# Patient Record
Sex: FEMALE | Race: WHITE | NOT HISPANIC OR LATINO | Employment: OTHER | URBAN - METROPOLITAN AREA
[De-identification: names, ages, dates, MRNs, and addresses within clinical notes are randomized per-mention and may not be internally consistent; named-entity substitution may affect disease eponyms.]

---

## 2019-10-31 ENCOUNTER — OFFICE VISIT (OUTPATIENT)
Dept: SURGERY | Facility: CLINIC | Age: 69
End: 2019-10-31

## 2019-10-31 VITALS — BODY MASS INDEX: 25.16 KG/M2 | HEART RATE: 78 BPM | WEIGHT: 142 LBS | HEIGHT: 63 IN | OXYGEN SATURATION: 99 %

## 2019-10-31 DIAGNOSIS — K44.9 LARGE HIATAL HERNIA: Primary | ICD-10-CM

## 2019-10-31 PROCEDURE — 99204 OFFICE O/P NEW MOD 45 MIN: CPT | Performed by: SURGERY

## 2019-10-31 RX ORDER — CETIRIZINE HYDROCHLORIDE 10 MG/1
10 TABLET ORAL DAILY
COMMUNITY
End: 2023-04-05

## 2019-10-31 RX ORDER — FEXOFENADINE HCL 180 MG/1
180 TABLET ORAL DAILY
COMMUNITY
End: 2019-11-18 | Stop reason: HOSPADM

## 2019-11-02 NOTE — PROGRESS NOTES
"SUMMARY (A/P):    69-year-old lady with history of gastroesophageal reflux symptoms controlled with Prilosec and now Protonix.  Ongoing symptoms of chest pain/discomfort that improves with belching.  Chest x-ray demonstrating moderate hiatal hernia.  I think her symptoms may indeed be related to the hiatal hernia and have recommended further evaluation.  She is scheduled for upper GI and further recommendations will follow acquisition of that study.      CC:    Hiatal hernia    HPI:    69-year-old lady reports presenting to her physician several weeks ago for mild pulmonary issues which led to chest x-ray.  Chest x-ray demonstrated moderate size hiatal hernia and she was referred to Dr. Estrada's office and from there she was referred here without further work-up.  She indicates that she has many year history of associated gastroesophageal reflux symptoms for which she has been on Protonix for 1 month and much longer had been on Prilosec prior to that.  She said she gets good control of her reflux symptoms but still has intermittent significant indigestion (specifically, she has mild to moderate chest pain/discomfort with meals that resolves with belching\").    PSH:    Colonoscopy 2018  Partial pituitary excision  Partial hysterectomy 1978    PMH:    Arthritis  Depression  Gastroesophageal reflux disease  Hyperlipidemia  Hypothyroidism    FAMILY HISTORY:    Negative for colorectal cancer    SOCIAL HISTORY:   Denies tobacco use  Occasional alcohol use    ALLERGIES: reviewed, in Epic    MEDICATIONS: reviewed, in Epic    ROS:  No chest pain or shortness of air.  All other systems reviewed and negative other than presenting complaints.    RADIOLOGY/ENDOSCOPY:    Chest x-ray 10/15/2019 [facility] [place]: Normal except moderate hiatal hernia.  I reviewed the disc of those images and concur.    PHYSICAL EXAM:   Constitutional: Well-developed well-nourished, no acute distress  Vital signs: HR 78, " weight 142 pounds, height 63 inches, BMI 25.2  Eyes: Conjunctiva normal, sclera nonicteric  ENMT: Hearing grossly normal, oral mucosa moist  Neck: Supple, no palpable mass, trachea midline  Respiratory: Clear to auscultation, normal inspiratory effort  Cardiovascular: Regular rate, no murmur, no carotid bruit, no peripheral edema, no jugular venous distention  Gastrointestinal: Soft, nontender, no palpable mass, no hepatosplenomegaly, negative for hernia, bowel sounds normal  Lymphatics (palpable nodes):  cervical-negative, axillary-negative  Skin:  Warm, dry, no rash on visualized skin surfaces  Musculoskeletal: Symmetric strength, normal gait  Psychiatric: Alert and oriented ×3, normal affect     KAREN DOZIER M.D.

## 2019-11-12 DIAGNOSIS — K44.9 LARGE HIATAL HERNIA: ICD-10-CM

## 2019-11-16 ENCOUNTER — APPOINTMENT (OUTPATIENT)
Dept: CT IMAGING | Facility: HOSPITAL | Age: 69
End: 2019-11-16

## 2019-11-16 ENCOUNTER — APPOINTMENT (OUTPATIENT)
Dept: GENERAL RADIOLOGY | Facility: HOSPITAL | Age: 69
End: 2019-11-16

## 2019-11-16 ENCOUNTER — HOSPITAL ENCOUNTER (OUTPATIENT)
Facility: HOSPITAL | Age: 69
Setting detail: OBSERVATION
Discharge: HOME | End: 2019-11-18
Attending: EMERGENCY MEDICINE | Admitting: HOSPITALIST

## 2019-11-16 DIAGNOSIS — R55 SYNCOPE AND COLLAPSE: Primary | ICD-10-CM

## 2019-11-16 DIAGNOSIS — E87.1 HYPONATREMIA: ICD-10-CM

## 2019-11-16 DIAGNOSIS — R11.2 NON-INTRACTABLE VOMITING WITH NAUSEA, UNSPECIFIED VOMITING TYPE: ICD-10-CM

## 2019-11-16 PROBLEM — E78.5 HYPERLIPIDEMIA: Status: ACTIVE | Noted: 2019-11-16

## 2019-11-16 PROBLEM — E03.9 HYPOTHYROIDISM: Status: ACTIVE | Noted: 2019-11-16

## 2019-11-16 LAB
ABO GROUP BLD: NORMAL
ALBUMIN SERPL-MCNC: 4.4 G/DL (ref 3.5–5.2)
ALBUMIN/GLOB SERPL: 1.6 G/DL
ALP SERPL-CCNC: 93 U/L (ref 39–117)
ALT SERPL W P-5'-P-CCNC: 27 U/L (ref 1–33)
ANION GAP SERPL CALCULATED.3IONS-SCNC: 13.8 MMOL/L (ref 5–15)
AST SERPL-CCNC: 30 U/L (ref 1–32)
BACTERIA UR QL AUTO: ABNORMAL /HPF
BASOPHILS # BLD AUTO: 0.07 10*3/MM3 (ref 0–0.2)
BASOPHILS NFR BLD AUTO: 0.8 % (ref 0–1.5)
BILIRUB SERPL-MCNC: 0.8 MG/DL (ref 0.2–1.2)
BILIRUB UR QL STRIP: NEGATIVE
BLD GP AB SCN SERPL QL: NEGATIVE
BUN BLD-MCNC: 11 MG/DL (ref 8–23)
BUN/CREAT SERPL: 10.2 (ref 7–25)
CALCIUM SPEC-SCNC: 9.7 MG/DL (ref 8.6–10.5)
CHLORIDE SERPL-SCNC: 97 MMOL/L (ref 98–107)
CLARITY UR: ABNORMAL
CO2 SERPL-SCNC: 22.2 MMOL/L (ref 22–29)
COLOR UR: YELLOW
CREAT BLD-MCNC: 1.08 MG/DL (ref 0.57–1)
DEPRECATED RDW RBC AUTO: 41.7 FL (ref 37–54)
EOSINOPHIL # BLD AUTO: 0.25 10*3/MM3 (ref 0–0.4)
EOSINOPHIL NFR BLD AUTO: 2.9 % (ref 0.3–6.2)
ERYTHROCYTE [DISTWIDTH] IN BLOOD BY AUTOMATED COUNT: 12.6 % (ref 12.3–15.4)
EXPIRATION DATE: NORMAL
FECAL OCCULT BLOOD SCREEN, POC: NEGATIVE
GFR SERPL CREATININE-BSD FRML MDRD: 50 ML/MIN/1.73
GLOBULIN UR ELPH-MCNC: 2.8 GM/DL
GLUCOSE BLD-MCNC: 89 MG/DL (ref 65–99)
GLUCOSE BLDC GLUCOMTR-MCNC: 91 MG/DL (ref 70–130)
GLUCOSE UR STRIP-MCNC: NEGATIVE MG/DL
HCT VFR BLD AUTO: 44.3 % (ref 34–46.6)
HGB BLD-MCNC: 14.6 G/DL (ref 12–15.9)
HGB UR QL STRIP.AUTO: NEGATIVE
HOLD SPECIMEN: NORMAL
HOLD SPECIMEN: NORMAL
HYALINE CASTS UR QL AUTO: ABNORMAL /LPF
IMM GRANULOCYTES # BLD AUTO: 0.02 10*3/MM3 (ref 0–0.05)
IMM GRANULOCYTES NFR BLD AUTO: 0.2 % (ref 0–0.5)
KETONES UR QL STRIP: ABNORMAL
LEUKOCYTE ESTERASE UR QL STRIP.AUTO: ABNORMAL
LYMPHOCYTES # BLD AUTO: 4.12 10*3/MM3 (ref 0.7–3.1)
LYMPHOCYTES NFR BLD AUTO: 48.1 % (ref 19.6–45.3)
Lab: 128
MAGNESIUM SERPL-MCNC: 2.2 MG/DL (ref 1.6–2.4)
MCH RBC QN AUTO: 29.8 PG (ref 26.6–33)
MCHC RBC AUTO-ENTMCNC: 33 G/DL (ref 31.5–35.7)
MCV RBC AUTO: 90.4 FL (ref 79–97)
MONOCYTES # BLD AUTO: 1.04 10*3/MM3 (ref 0.1–0.9)
MONOCYTES NFR BLD AUTO: 12.1 % (ref 5–12)
NEGATIVE CONTROL: NEGATIVE
NEUTROPHILS # BLD AUTO: 3.07 10*3/MM3 (ref 1.7–7)
NEUTROPHILS NFR BLD AUTO: 35.9 % (ref 42.7–76)
NITRITE UR QL STRIP: NEGATIVE
NRBC BLD AUTO-RTO: 0 /100 WBC (ref 0–0.2)
PH UR STRIP.AUTO: <=5 [PH] (ref 5–8)
PLATELET # BLD AUTO: 261 10*3/MM3 (ref 140–450)
PMV BLD AUTO: 9.7 FL (ref 6–12)
POSITIVE CONTROL: POSITIVE
POTASSIUM BLD-SCNC: 3.6 MMOL/L (ref 3.5–5.2)
PROT SERPL-MCNC: 7.2 G/DL (ref 6–8.5)
PROT UR QL STRIP: ABNORMAL
RBC # BLD AUTO: 4.9 10*6/MM3 (ref 3.77–5.28)
RBC # UR: ABNORMAL /HPF
REF LAB TEST METHOD: ABNORMAL
RH BLD: POSITIVE
SODIUM BLD-SCNC: 133 MMOL/L (ref 136–145)
SP GR UR STRIP: 1.02 (ref 1–1.03)
SQUAMOUS #/AREA URNS HPF: ABNORMAL /HPF
T&S EXPIRATION DATE: NORMAL
TROPONIN T SERPL-MCNC: <0.01 NG/ML (ref 0–0.03)
UROBILINOGEN UR QL STRIP: ABNORMAL
WBC NRBC COR # BLD: 8.57 10*3/MM3 (ref 3.4–10.8)
WBC UR QL AUTO: ABNORMAL /HPF
WHOLE BLOOD HOLD SPECIMEN: NORMAL
WHOLE BLOOD HOLD SPECIMEN: NORMAL

## 2019-11-16 PROCEDURE — 81001 URINALYSIS AUTO W/SCOPE: CPT | Performed by: EMERGENCY MEDICINE

## 2019-11-16 PROCEDURE — 82962 GLUCOSE BLOOD TEST: CPT

## 2019-11-16 PROCEDURE — 85025 COMPLETE CBC W/AUTO DIFF WBC: CPT | Performed by: EMERGENCY MEDICINE

## 2019-11-16 PROCEDURE — 96372 THER/PROPH/DIAG INJ SC/IM: CPT

## 2019-11-16 PROCEDURE — 82270 OCCULT BLOOD FECES: CPT | Performed by: EMERGENCY MEDICINE

## 2019-11-16 PROCEDURE — 86901 BLOOD TYPING SEROLOGIC RH(D): CPT | Performed by: EMERGENCY MEDICINE

## 2019-11-16 PROCEDURE — 93010 ELECTROCARDIOGRAM REPORT: CPT | Performed by: INTERNAL MEDICINE

## 2019-11-16 PROCEDURE — 25010000002 ENOXAPARIN PER 10 MG: Performed by: HOSPITALIST

## 2019-11-16 PROCEDURE — 80053 COMPREHEN METABOLIC PANEL: CPT | Performed by: EMERGENCY MEDICINE

## 2019-11-16 PROCEDURE — 70450 CT HEAD/BRAIN W/O DYE: CPT

## 2019-11-16 PROCEDURE — 84484 ASSAY OF TROPONIN QUANT: CPT | Performed by: EMERGENCY MEDICINE

## 2019-11-16 PROCEDURE — 86900 BLOOD TYPING SEROLOGIC ABO: CPT | Performed by: EMERGENCY MEDICINE

## 2019-11-16 PROCEDURE — 96374 THER/PROPH/DIAG INJ IV PUSH: CPT

## 2019-11-16 PROCEDURE — G0378 HOSPITAL OBSERVATION PER HR: HCPCS

## 2019-11-16 PROCEDURE — 83735 ASSAY OF MAGNESIUM: CPT | Performed by: EMERGENCY MEDICINE

## 2019-11-16 PROCEDURE — 71045 X-RAY EXAM CHEST 1 VIEW: CPT

## 2019-11-16 PROCEDURE — 86850 RBC ANTIBODY SCREEN: CPT | Performed by: EMERGENCY MEDICINE

## 2019-11-16 PROCEDURE — 25010000002 METOCLOPRAMIDE PER 10 MG: Performed by: EMERGENCY MEDICINE

## 2019-11-16 PROCEDURE — 93005 ELECTROCARDIOGRAM TRACING: CPT | Performed by: EMERGENCY MEDICINE

## 2019-11-16 PROCEDURE — P9612 CATHETERIZE FOR URINE SPEC: HCPCS

## 2019-11-16 PROCEDURE — 99285 EMERGENCY DEPT VISIT HI MDM: CPT

## 2019-11-16 RX ORDER — SODIUM CHLORIDE 0.9 % (FLUSH) 0.9 %
10 SYRINGE (ML) INJECTION AS NEEDED
Status: DISCONTINUED | OUTPATIENT
Start: 2019-11-16 | End: 2019-11-16

## 2019-11-16 RX ORDER — LEVOTHYROXINE SODIUM 0.07 MG/1
75 TABLET ORAL
Status: DISCONTINUED | OUTPATIENT
Start: 2019-11-17 | End: 2019-11-18 | Stop reason: HOSPADM

## 2019-11-16 RX ORDER — ONDANSETRON 4 MG/1
4 TABLET, FILM COATED ORAL EVERY 6 HOURS PRN
Status: DISCONTINUED | OUTPATIENT
Start: 2019-11-16 | End: 2019-11-18 | Stop reason: HOSPADM

## 2019-11-16 RX ORDER — L.ACID,PARA/B.BIFIDUM/S.THERM 8B CELL
1 CAPSULE ORAL DAILY
Status: DISCONTINUED | OUTPATIENT
Start: 2019-11-17 | End: 2019-11-18 | Stop reason: HOSPADM

## 2019-11-16 RX ORDER — PANTOPRAZOLE SODIUM 40 MG/1
40 TABLET, DELAYED RELEASE ORAL
Status: DISCONTINUED | OUTPATIENT
Start: 2019-11-17 | End: 2019-11-18 | Stop reason: HOSPADM

## 2019-11-16 RX ORDER — ACETAMINOPHEN 325 MG/1
650 TABLET ORAL EVERY 4 HOURS PRN
Status: DISCONTINUED | OUTPATIENT
Start: 2019-11-16 | End: 2019-11-18 | Stop reason: HOSPADM

## 2019-11-16 RX ORDER — PREDNISONE 2.5 MG
2.5 TABLET ORAL DAILY
Status: DISCONTINUED | OUTPATIENT
Start: 2019-11-17 | End: 2019-11-18

## 2019-11-16 RX ORDER — GLIMEPIRIDE 2 MG/1
1 TABLET ORAL DAILY
COMMUNITY

## 2019-11-16 RX ORDER — PRAMIPEXOLE DIHYDROCHLORIDE 0.25 MG/1
0.12 TABLET ORAL DAILY
Status: DISCONTINUED | OUTPATIENT
Start: 2019-11-16 | End: 2019-11-17

## 2019-11-16 RX ORDER — PSYLLIUM SEED (WITH DEXTROSE)
2 POWDER (GRAM) ORAL DAILY
Status: DISCONTINUED | OUTPATIENT
Start: 2019-11-16 | End: 2019-11-18 | Stop reason: HOSPADM

## 2019-11-16 RX ORDER — ESCITALOPRAM OXALATE 20 MG/1
20 TABLET ORAL DAILY
Status: DISCONTINUED | OUTPATIENT
Start: 2019-11-17 | End: 2019-11-18 | Stop reason: HOSPADM

## 2019-11-16 RX ORDER — ACETAMINOPHEN 650 MG/1
650 SUPPOSITORY RECTAL EVERY 4 HOURS PRN
Status: DISCONTINUED | OUTPATIENT
Start: 2019-11-16 | End: 2019-11-18 | Stop reason: HOSPADM

## 2019-11-16 RX ORDER — SODIUM CHLORIDE 9 MG/ML
75 INJECTION, SOLUTION INTRAVENOUS CONTINUOUS
Status: DISCONTINUED | OUTPATIENT
Start: 2019-11-16 | End: 2019-11-18 | Stop reason: HOSPADM

## 2019-11-16 RX ORDER — ONDANSETRON 2 MG/ML
4 INJECTION INTRAMUSCULAR; INTRAVENOUS EVERY 6 HOURS PRN
Status: DISCONTINUED | OUTPATIENT
Start: 2019-11-16 | End: 2019-11-18 | Stop reason: HOSPADM

## 2019-11-16 RX ORDER — CETIRIZINE HYDROCHLORIDE 10 MG/1
5 TABLET ORAL DAILY
Status: DISCONTINUED | OUTPATIENT
Start: 2019-11-16 | End: 2019-11-18 | Stop reason: HOSPADM

## 2019-11-16 RX ORDER — ACETAMINOPHEN 160 MG/5ML
650 SOLUTION ORAL EVERY 4 HOURS PRN
Status: DISCONTINUED | OUTPATIENT
Start: 2019-11-16 | End: 2019-11-18 | Stop reason: HOSPADM

## 2019-11-16 RX ORDER — METOCLOPRAMIDE HYDROCHLORIDE 5 MG/ML
10 INJECTION INTRAMUSCULAR; INTRAVENOUS ONCE
Status: COMPLETED | OUTPATIENT
Start: 2019-11-16 | End: 2019-11-16

## 2019-11-16 RX ORDER — NITROGLYCERIN 0.4 MG/1
0.4 TABLET SUBLINGUAL
Status: DISCONTINUED | OUTPATIENT
Start: 2019-11-16 | End: 2019-11-18 | Stop reason: HOSPADM

## 2019-11-16 RX ADMIN — ENOXAPARIN SODIUM 40 MG: 40 INJECTION SUBCUTANEOUS at 19:44

## 2019-11-16 RX ADMIN — METOCLOPRAMIDE 10 MG: 5 INJECTION, SOLUTION INTRAMUSCULAR; INTRAVENOUS at 13:20

## 2019-11-16 RX ADMIN — SODIUM CHLORIDE 125 ML/HR: 9 INJECTION, SOLUTION INTRAVENOUS at 19:43

## 2019-11-16 RX ADMIN — PRAMIPEXOLE DIHYDROCHLORIDE 0.12 MG: 0.25 TABLET ORAL at 20:29

## 2019-11-16 RX ADMIN — SODIUM CHLORIDE 1000 ML: 9 INJECTION, SOLUTION INTRAVENOUS at 13:17

## 2019-11-16 RX ADMIN — SODIUM CHLORIDE, PRESERVATIVE FREE 10 ML: 5 INJECTION INTRAVENOUS at 13:18

## 2019-11-16 RX ADMIN — Medication 2 WAFER: at 20:29

## 2019-11-16 RX ADMIN — CETIRIZINE HYDROCHLORIDE 5 MG: 10 TABLET, FILM COATED ORAL at 20:28

## 2019-11-16 NOTE — H&P
"    Patient Name:  Mariana Gilbert  YOB: 1950  MRN:  [id number]  Admit Date:  11/16/2019  Patient Care Team:  Racheal Adams APRN as PCP - General (Family Medicine)  Edward Sterling MD as PCP - Family Medicine      Subjective   History Present Illness     Chief Complaint   Patient presents with   • Syncope       Ms. Gilbert is a 69 y.o. former smoker with a history of GERD, HLD and hypothyroidism that presents to [facility] complaining of a syncopal episode.  She states she has not felt very well since getting a flu shot about 3 days ago.  She had a small loose stool this morning but no abdominal pain.  Felt okay enough to go shopping.  She was walking with family members when she started feeling abnormal.  She became a little lightheaded and nauseated and decided to sit down.  She then passed out.  This lasted for about 20 seconds but there was an episode of fecal incontinence.  When she woke up she was very nauseated and had emesis.  It was reported both legs were \"twitching.\"  But no generalized seizure activity.  Patient reports a similar episode a few months ago but there is no incontinence at that time.  She did not seek medical attention at that time.  She denies any recent change to her medications.  There is no associated chest pain or palpitations.  She has previously seen Dr. Velázquez.  She had a negative stress test in January 2016.  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  She takes prednisone and Synthroid and has not missed any dosages.        History of Present Illness  Review of Systems   HENT: Negative.    Respiratory: Negative.    Cardiovascular: Negative for chest pain.   Gastrointestinal: Positive for diarrhea, nausea and vomiting.   Endocrine: Negative.    Genitourinary: Negative.    Musculoskeletal: Negative.    Skin: Negative.    Neurological: Positive for weakness and " light-headedness.   Hematological: Negative.    Psychiatric/Behavioral: Negative.         Personal History     Past Medical History:   Diagnosis Date   • Arthritis    • Depression    • Exertional dyspnea    • Fatigue    • GERD (gastroesophageal reflux disease)    • Hyperlipidemia    • Hypothyroidism    • IBS (irritable bowel syndrome)    • Seasonal allergies      Past Surgical History:   Procedure Laterality Date   • BREAST BIOPSY Bilateral 1970s    benign pathology   • COLONOSCOPY N/A 2018    done at [facility]   • PARTIAL HYSTERECTOMY Bilateral     Bilateral ovaries in tact-Dr. Ulrich   • PITUITARY EXCISION      partial     Family History   Problem Relation Age of Onset   • Heart disease Mother    • Aortic aneurysm Sister    • Heart disease Sister    • Breast cancer Sister    • Aortic aneurysm Brother    • Heart disease Brother    • Stroke Brother         CVA   • Hypertension Other      Social History     Tobacco Use   • Smoking status: Former Smoker     Packs/day: 1.00     Years: 30.00     Pack years: 30.00     Types: Cigarettes     Last attempt to quit: 2000     Years since quittin.8   • Smokeless tobacco: Never Used   Substance Use Topics   • Alcohol use: Yes     Comment: social, 1-2 drinks occassionally   • Drug use: No     No current facility-administered medications on file prior to encounter.      Current Outpatient Medications on File Prior to Encounter   Medication Sig Dispense Refill   • cetirizine (zyrTEC) 10 MG tablet Take 10 mg by mouth Daily.     • Difluprednate 0.05 % emulsion Administer 1 drop to the right eye Daily.     • escitalopram (LEXAPRO) 20 MG tablet Take 20 mg by mouth Daily.     • fexofenadine (ALLEGRA) 180 MG tablet Take 180 mg by mouth Daily.     • levothyroxine (SYNTHROID, LEVOTHROID) 75 MCG tablet Take 75 mcg by mouth Daily.     • pantoprazole (PROTONIX) 40 MG EC tablet Take 40 mg by mouth Daily.     • pramipexole (MIRAPEX) 0.125 MG tablet Take 0.125 mg by mouth  Daily.     • predniSONE (DELTASONE) 2.5 MG tablet Take 2.5 mg by mouth Daily.     • Probiotic Product (PROBIOTIC PO) Take 1 capsule by mouth Daily. Culturelle OTC     • psyllium (METAMUCIL) 58.6 % packet Take 1 packet by mouth Daily.     • timolol (TIMOPTIC) 0.25 % ophthalmic solution Administer 1 drop into the left eye Daily.     • [DISCONTINUED] montelukast (SINGULAIR) 10 MG tablet Take 10 mg by mouth As Needed.       Allergies   Allergen Reactions   • Sulfa Antibiotics Rash       Objective    Objective     Vital Signs  Temp:  [97 °F (36.1 °C)-99 °F (37.2 °C)] 98.5 °F (36.9 °C)  Heart Rate:  [] 106  Resp:  [16-18] 16  BP: (105-138)/(60-83) 131/80  SpO2:  [95 %-98 %] 96 %  on   ;   Device (Oxygen Therapy): room air  Body mass index is 25.77 kg/m².    Physical Exam   Constitutional: She is oriented to person, place, and time. She appears well-developed and well-nourished. No distress.   HENT:   Head: Normocephalic and atraumatic.   Eyes: Conjunctivae and EOM are normal. No scleral icterus.   Neck: Normal range of motion. No JVD present.   Cardiovascular: Normal rate and regular rhythm.  Extrasystoles are present.   Pulmonary/Chest: Effort normal and breath sounds normal.   Abdominal: Soft. Bowel sounds are normal. She exhibits no distension. There is no tenderness.   Musculoskeletal: Normal range of motion. She exhibits no edema.   Neurological: She is alert and oriented to person, place, and time.   Skin: Skin is warm and dry.   Psychiatric: She has a normal mood and affect. Her behavior is normal.   Nursing note and vitals reviewed.      Results Review:  I reviewed the patient's new clinical results.  I reviewed the patient's new imaging results and agree with the interpretation.  I reviewed the patient's other test results and agree with the interpretation  I personally viewed and interpreted the patient's EKG/Telemetry data  Discussed with ED provider.    Lab Results (last 24 hours)     Procedure Component  Value Units Date/Time    POC Glucose Once [[id number]]  (Normal) Collected:  11/16/19 1227    Specimen:  Blood Updated:  11/16/19 1229     Glucose 91 mg/dL     POC Occult Blood Stool [[id number]]  (Normal) Collected:  11/16/19 1232    Specimen:  Stool from Per Rectum Updated:  11/16/19 1233     Fecal Occult Blood Negative     Lot Number [id number]     Expiration Date 05/31/2020     Positive Control Positive     Negative Control Negative    Troponin [[id number]]  (Normal) Collected:  11/16/19 1316    Specimen:  Blood Updated:  11/16/19 1356     Troponin T <0.010 ng/mL     Narrative:       Troponin T Reference Range:  <= 0.03 ng/mL-   Negative for AMI  >0.03 ng/mL-     Abnormal for myocardial necrosis.  Clinicians would have to utilize clinical acumen, EKG, Troponin and serial changes to determine if it is an Acute Myocardial Infarction or myocardial injury due to an underlying chronic condition.     CBC & Differential [[id number]] Collected:  11/16/19 1316    Specimen:  Blood Updated:  11/16/19 1337    Narrative:       The following orders were created for panel order CBC & Differential.  Procedure                               Abnormality         Status                     ---------                               -----------         ------                     CBC Auto Differential[[id number]]        Abnormal            Final result                 Please view results for these tests on the individual orders.    Comprehensive Metabolic Panel [[id number]]  (Abnormal) Collected:  11/16/19 1316    Specimen:  Blood Updated:  11/16/19 1357     Glucose 89 mg/dL      BUN 11 mg/dL      Creatinine 1.08 mg/dL      Sodium 133 mmol/L      Potassium 3.6 mmol/L      Chloride 97 mmol/L      CO2 22.2 mmol/L      Calcium 9.7 mg/dL      Total Protein 7.2 g/dL      Albumin 4.40 g/dL      ALT (SGPT) 27 U/L      AST (SGOT) 30 U/L      Alkaline Phosphatase 93 U/L      Total Bilirubin 0.8 mg/dL      eGFR Non African Amer 50 mL/min/1.73      Globulin 2.8  gm/dL      A/G Ratio 1.6 g/dL      BUN/Creatinine Ratio 10.2     Anion Gap 13.8 mmol/L     Narrative:       GFR Normal >60  Chronic Kidney Disease <60  Kidney Failure <15    Magnesium [[id number]]  (Normal) Collected:  11/16/19 1316    Specimen:  Blood Updated:  11/16/19 1356     Magnesium 2.2 mg/dL     CBC Auto Differential [[id number]]  (Abnormal) Collected:  11/16/19 1316    Specimen:  Blood Updated:  11/16/19 1337     WBC 8.57 10*3/mm3      RBC 4.90 10*6/mm3      Hemoglobin 14.6 g/dL      Hematocrit 44.3 %      MCV 90.4 fL      MCH 29.8 pg      MCHC 33.0 g/dL      RDW 12.6 %      RDW-SD 41.7 fl      MPV 9.7 fL      Platelets 261 10*3/mm3      Neutrophil % 35.9 %      Lymphocyte % 48.1 %      Monocyte % 12.1 %      Eosinophil % 2.9 %      Basophil % 0.8 %      Immature Grans % 0.2 %      Neutrophils, Absolute 3.07 10*3/mm3      Lymphocytes, Absolute 4.12 10*3/mm3      Monocytes, Absolute 1.04 10*3/mm3      Eosinophils, Absolute 0.25 10*3/mm3      Basophils, Absolute 0.07 10*3/mm3      Immature Grans, Absolute 0.02 10*3/mm3      nRBC 0.0 /100 WBC     Urinalysis With Microscopic If Indicated (No Culture) - Urine, Catheter [[id number]]  (Abnormal) Collected:  11/16/19 1359    Specimen:  Urine, Catheter Updated:  11/16/19 1434     Color, UA Yellow     Appearance, UA Cloudy     pH, UA <=5.0     Specific Gravity, UA 1.019     Glucose, UA Negative     Ketones, UA Trace     Bilirubin, UA Negative     Blood, UA Negative     Protein, UA Trace     Leuk Esterase, UA Trace     Nitrite, UA Negative     Urobilinogen, UA 0.2 E.U./dL    Urinalysis, Microscopic Only - Urine, Catheter [[id number]]  (Abnormal) Collected:  11/16/19 1359    Specimen:  Urine, Catheter Updated:  11/16/19 1450     RBC, UA None Seen /HPF      WBC, UA 0-2 /HPF      Bacteria, UA Trace /HPF      Squamous Epithelial Cells, UA 0-2 /HPF      Hyaline Casts, UA Too Numerous to Count /LPF      Methodology Manual Light Microscopy          Imaging Results (Last 24  Hours)     Procedure Component Value Units Date/Time    XR Chest 1 View [[id number]] Collected:  11/16/19 1338     Updated:  11/16/19 1543    Narrative:       ONE VIEW PORTABLE CHEST     HISTORY: Cough. Nausea.     FINDINGS: The lungs are well-expanded and clear and the heart size is  normal. No acute abnormality is seen. There may be a small hiatus  hernia.     This report was finalized on 11/16/2019 3:40 PM by Dr. Javad Casanova M.D.       CT Head Without Contrast [[id number]] Collected:  11/16/19 1415     Updated:  11/16/19 1543    Narrative:       CT SCAN OF THE BRAIN WITHOUT CONTRAST     HISTORY: Syncopal episode. Incontinence.     TECHNIQUE/FINDINGS: The CT scan was performed as an emergency procedure  through the brain without contrast. There is mild diffuse atrophy and  chronic small vessel ischemic change. There is no evidence of  intracranial hemorrhage or mass effect and the visualized sinuses are  clear.                 Radiation dose reduction techniques were utilized, including automated  exposure control and exposure modulation based on body size.     This report was finalized on 11/16/2019 3:40 PM by Dr. Javad Casanova M.D.                ECG 12 Lead   Final Result   HEART RATE= 81  bpm   RR Interval= 728  ms   [place] Interval= 182  ms   P Horizontal Axis= 0  deg   P Front Axis= 55  deg   QRSD Interval= 75  ms   QT Interval= 385  ms   QRS Axis= 2  deg   T Wave Axis= 45  deg   - OTHERWISE NORMAL ECG -   Sinus rhythm   Ventricular premature complex   NO SIGNIFICANT CHANGE FROM PREVIOUS ECG   Electronically Signed By: Wicho Mcgraw ([facility] 16-Nov-2019 12:32:45   Date and Time of Study: 2019-11-16 11:58:45      ECG 12 Lead    (Results Pending)        Assessment/Plan     Active Hospital Problems    Diagnosis POA   • **Syncope and collapse [R55] Yes   • Hypothyroidism [E03.9] Yes   • Hyperlipidemia [E78.5] Yes       69 y.o. female admitted with Syncope and collapse.    · This spell was associated with  nausea, vomiting and diarrhea.  Likely vasovagal.  It has happened twice in the last few months.  She has an appointment to see Dr. Velázquez on Monday and will consult her to see.  · Will give IV fluids and check orthostatics in a.m.  · Clinically does not seem like a stroke, TIA or seizure.  · Lovenox 40 mg SC daily for DVT prophylaxis.  · Full code.  · Discussed with patient and ED provider.      Vel Cronin MD  [facility]  11/16/19  8:46 PM

## 2019-11-16 NOTE — ED NOTES
Orthostatics unable to do at this time. Pt with N/V and dizzy.  aware pt unable to do at this time.     Karen Saeed  11/16/19 3896

## 2019-11-16 NOTE — ED TRIAGE NOTES
Was shopping and felt hot, flushed, cold, nausea.  Had a syncopal episode.  Daughter reports feet were shaking.  Was incontinent of stool.

## 2019-11-16 NOTE — ED PROVIDER NOTES
" EMERGENCY DEPARTMENT ENCOUNTER    CHIEF COMPLAINT  Chief Complaint: syncope  History given by: patient, patient's spouse, patient's daughter  History limited by: nothing  Room Number: 29/29  PMD: Racheal Adams APRN   Cardiologist: Dr. Lety Velázquez      HPI:  Pt is a 69 y.o. female who presents to the ED with complaint of a syncopal episode that occurred prior to arrival. The patient's daughter reports the patient complained of nausea, hot and cold flashes, and light-headedness prior to the witnessed syncopal episode. The patient's daughter reports the patient slumped over and became unresponsive to verbal stimuli, had her eyes open, BLE \"twitching\", and fecal incontinence. The patient's daughter reports these symptoms lasted for approximately two minutes. The patient's spouse reports the patient had a similar episode a few months ago but was near syncopal, shorter lived and she was not incontinent at that time. The patient reports she had one episode of loose stool earlier this morning. The patient complains of generalized weakness currently, a bifrontal headache x 30 minutes, and a nonproductive cough x three days. The patient denies associated abdominal pain, fever, shortness of breath, chest pain, recent falls, or head injuries. The patient's spouse denies that the patient has a hx of seizures. The patient's spouse reports the patient is not currently anticoagulated. The patient reports she is currently on Prednisone d/t her hx of adrenal insufficiency. She denies missing any dosages. There are no other complaints at this time. Pt's spouse and daughter at bedside.    Duration: occurred prior to arrival, lasted for two minutes  Onset: sudden  Timing: episodic  Quality: syncope  Intensity/Severity: moderate  Progression: resolved  Associated Symptoms: nausea, hot and cold flashes, light-headedness, fecal incontinence, one episode of loose stool earlier this morning, generalized weakness currently, BLE " "\"twitching\", a bifrontal headache x 30 minutes, and a nonproductive cough x three days  Aggravating Factors: none specified  Alleviating Factors: none specified  Previous Episodes: The patient's spouse reports the patient had a similar episode a few months ago but she was not incontinent at that time.   Treatment before arrival: none specified    PAST MEDICAL HISTORY  Active Ambulatory Problems     Diagnosis Date Noted   • Dyspnea on exertion 2016   • Fatigue 2016     Resolved Ambulatory Problems     Diagnosis Date Noted   • No Resolved Ambulatory Problems     Past Medical History:   Diagnosis Date   • Arthritis    • Depression    • Exertional dyspnea    • Fatigue    • GERD (gastroesophageal reflux disease)    • Hyperlipidemia    • Hypothyroidism    • IBS (irritable bowel syndrome)    • Seasonal allergies        PAST SURGICAL HISTORY  Past Surgical History:   Procedure Laterality Date   • BREAST BIOPSY Bilateral     benign pathology   • COLONOSCOPY N/A 2018    done at [facility]   • PARTIAL HYSTERECTOMY Bilateral     Bilateral ovaries in tact-Dr. Ulrich   • PITUITARY EXCISION      partial       FAMILY HISTORY  Family History   Problem Relation Age of Onset   • Heart disease Mother    • Aortic aneurysm Sister    • Heart disease Sister    • Breast cancer Sister    • Aortic aneurysm Brother    • Heart disease Brother    • Stroke Brother         CVA   • Hypertension Other        SOCIAL HISTORY  Social History     Socioeconomic History   • Marital status:      Spouse name: Not on file   • Number of children: Not on file   • Years of education: Not on file   • Highest education level: Not on file   Tobacco Use   • Smoking status: Former Smoker     Packs/day: 1.00     Years: 30.00     Pack years: 30.00     Types: Cigarettes     Last attempt to quit: 2000     Years since quittin.8   • Smokeless tobacco: Never Used   Substance and Sexual Activity   • Alcohol use: Yes     Comment: " "social, 1-2 drinks occassionally   • Drug use: No   • Sexual activity: Defer       ALLERGIES  Sulfa antibiotics    REVIEW OF SYSTEMS  Review of Systems   Constitutional: Positive for chills (hot and cold flashes). Negative for fever.   HENT: Negative for rhinorrhea and sore throat.    Eyes: Negative for visual disturbance.   Respiratory: Positive for cough (nonproductive). Negative for shortness of breath.    Cardiovascular: Negative for chest pain, palpitations and leg swelling.   Gastrointestinal: Positive for diarrhea (one episode of loose stool earlier this morning) and nausea. Negative for abdominal pain and vomiting.        Positive for fecal incontinence   Endocrine: Negative.    Genitourinary: Negative for decreased urine volume, dysuria and frequency.   Musculoskeletal: Negative for neck pain.   Skin: Negative for rash.   Neurological: Positive for tremors (\"twitching\" of bilateral lower extremities), syncope, weakness (generalized), light-headedness and headaches (bifrontal).   Psychiatric/Behavioral: Negative.    All other systems reviewed and are negative.      PHYSICAL EXAM  ED Triage Vitals [11/16/19 1125]   Temp Heart Rate Resp BP SpO2   97 °F (36.1 °C) 91 16 105/60 98 %      Temp src Heart Rate Source Patient Position BP Location FiO2 (%)   Tympanic Monitor -- -- --       Physical Exam   Constitutional: She is oriented to person, place, and time. She appears distressed (mild).   pale, diaphoretic   HENT:   Head: Normocephalic and atraumatic.   Mouth/Throat: Oropharynx is clear and moist.   Eyes: EOM are normal.   There are post surgical changes of her left eye.   Neck: Normal range of motion. Neck supple. No JVD present. No Brudzinski's sign and no Kernig's sign noted.   Cardiovascular: Normal rate, regular rhythm, normal heart sounds and intact distal pulses. Exam reveals no gallop and no friction rub.   No murmur heard.  Pulmonary/Chest: Effort normal and breath sounds normal. No respiratory " distress. She has no decreased breath sounds. She has no wheezes. She has no rhonchi. She has no rales. She exhibits no tenderness.   Abdominal: Soft. Bowel sounds are normal. She exhibits no distension and no mass. There is no tenderness. There is no rebound and no guarding.   Genitourinary: Rectal exam shows no tenderness and guaiac negative stool.   Genitourinary Comments: Performed chaperoned rectal exam with  Tech Karen bedside. Normal tone,    Musculoskeletal: Normal range of motion. She exhibits no edema.   Neurological: She is alert and oriented to person, place, and time. She has normal sensation and normal strength.   Skin: Skin is warm. No rash noted. There is pallor.   Psychiatric: Mood and affect normal.   Nursing note and vitals reviewed.      LAB RESULTS  Lab Results (last 24 hours)     Procedure Component Value Units Date/Time    POC Glucose Once [[id number]]  (Normal) Collected:  11/16/19 1227    Specimen:  Blood Updated:  11/16/19 1229     Glucose 91 mg/dL     POC Occult Blood Stool [[id number]]  (Normal) Collected:  11/16/19 1232    Specimen:  Stool from Per Rectum Updated:  11/16/19 1233     Fecal Occult Blood Negative     Lot Number [id number]     Expiration Date 05/31/2020     Positive Control Positive     Negative Control Negative    Troponin [[id number]]  (Normal) Collected:  11/16/19 1316    Specimen:  Blood Updated:  11/16/19 1356     Troponin T <0.010 ng/mL     Narrative:       Troponin T Reference Range:  <= 0.03 ng/mL-   Negative for AMI  >0.03 ng/mL-     Abnormal for myocardial necrosis.  Clinicians would have to utilize clinical acumen, EKG, Troponin and serial changes to determine if it is an Acute Myocardial Infarction or myocardial injury due to an underlying chronic condition.     CBC & Differential [[id number]] Collected:  11/16/19 1316    Specimen:  Blood Updated:  11/16/19 1337    Narrative:       The following orders were created for panel order CBC & Differential.  Procedure                                Abnormality         Status                     ---------                               -----------         ------                     CBC Auto Differential[[id number]]        Abnormal            Final result                 Please view results for these tests on the individual orders.    Comprehensive Metabolic Panel [[id number]]  (Abnormal) Collected:  11/16/19 1316    Specimen:  Blood Updated:  11/16/19 1357     Glucose 89 mg/dL      BUN 11 mg/dL      Creatinine 1.08 mg/dL      Sodium 133 mmol/L      Potassium 3.6 mmol/L      Chloride 97 mmol/L      CO2 22.2 mmol/L      Calcium 9.7 mg/dL      Total Protein 7.2 g/dL      Albumin 4.40 g/dL      ALT (SGPT) 27 U/L      AST (SGOT) 30 U/L      Alkaline Phosphatase 93 U/L      Total Bilirubin 0.8 mg/dL      eGFR Non African Amer 50 mL/min/1.73      Globulin 2.8 gm/dL      A/G Ratio 1.6 g/dL      BUN/Creatinine Ratio 10.2     Anion Gap 13.8 mmol/L     Narrative:       GFR Normal >60  Chronic Kidney Disease <60  Kidney Failure <15    Magnesium [[id number]]  (Normal) Collected:  11/16/19 1316    Specimen:  Blood Updated:  11/16/19 1356     Magnesium 2.2 mg/dL     CBC Auto Differential [[id number]]  (Abnormal) Collected:  11/16/19 1316    Specimen:  Blood Updated:  11/16/19 1337     WBC 8.57 10*3/mm3      RBC 4.90 10*6/mm3      Hemoglobin 14.6 g/dL      Hematocrit 44.3 %      MCV 90.4 fL      MCH 29.8 pg      MCHC 33.0 g/dL      RDW 12.6 %      RDW-SD 41.7 fl      MPV 9.7 fL      Platelets 261 10*3/mm3      Neutrophil % 35.9 %      Lymphocyte % 48.1 %      Monocyte % 12.1 %      Eosinophil % 2.9 %      Basophil % 0.8 %      Immature Grans % 0.2 %      Neutrophils, Absolute 3.07 10*3/mm3      Lymphocytes, Absolute 4.12 10*3/mm3      Monocytes, Absolute 1.04 10*3/mm3      Eosinophils, Absolute 0.25 10*3/mm3      Basophils, Absolute 0.07 10*3/mm3      Immature Grans, Absolute 0.02 10*3/mm3      nRBC 0.0 /100 WBC     Urinalysis With Microscopic If Indicated (No  Culture) - Urine, Catheter [[id number]]  (Abnormal) Collected:  11/16/19 1359    Specimen:  Urine, Catheter Updated:  11/16/19 1434     Color, UA Yellow     Appearance, UA Cloudy     pH, UA <=5.0     Specific Gravity, UA 1.019     Glucose, UA Negative     Ketones, UA Trace     Bilirubin, UA Negative     Blood, UA Negative     Protein, UA Trace     Leuk Esterase, UA Trace     Nitrite, UA Negative     Urobilinogen, UA 0.2 E.U./dL    Urinalysis, Microscopic Only - Urine, Catheter [[id number]]  (Abnormal) Collected:  11/16/19 1359    Specimen:  Urine, Catheter Updated:  11/16/19 1450     RBC, UA None Seen /HPF      WBC, UA 0-2 /HPF      Bacteria, UA Trace /HPF      Squamous Epithelial Cells, UA 0-2 /HPF      Hyaline Casts, UA Too Numerous to Count /LPF      Methodology Manual Light Microscopy          I ordered the above labs and reviewed the results    RADIOLOGY  Ct Head Without Contrast    Result Date: 11/16/2019  Narrative: CT SCAN OF THE BRAIN WITHOUT CONTRAST  HISTORY: Syncopal episode. Incontinence.  TECHNIQUE/FINDINGS: The CT scan was performed as an emergency procedure through the brain without contrast. There is mild diffuse atrophy and chronic small vessel ischemic change. There is no evidence of intracranial hemorrhage or mass effect and the visualized sinuses are clear.      Radiation dose reduction techniques were utilized, including automated exposure control and exposure modulation based on body size.       Xr Chest 1 View    Result Date: 11/16/2019  Narrative: ONE VIEW PORTABLE CHEST  HISTORY: Cough. Nausea.  FINDINGS: The lungs are well-expanded and clear and the heart size is normal. No acute abnormality is seen. There may be a small hiatus hernia.        I ordered the above noted radiological studies. Interpreted by radiologist. Discussed with radiologist (Dr. Casanova). Reviewed by me in PACS.       PROCEDURES  Procedures     EKG          EKG time: 1158  Rhythm/Rate: SR with occasional PVC's, rate in  the 80's  P waves and CT: nml; nml  QRS, axis: unremarkable QRS   ST and T waves: unremarkable ST/T wave findings     Interpreted Contemporaneously by me, independently viewed  Minimal change compared to prior from 04/29/2019.        PROGRESS AND CONSULTS      1154 Ordered CT Head, EKG, and blood work for further evaluation.    1229 Ordered Reglan for nausea and IV Fluids for hydration. Also ordered CXR for further evaluation.    1305 Received a call from Dr. Casanova, radiology, who stated that the CT Head shows nothing acute.    1451 Placed call to [facility] for admission.    1518 Received a call from Dr. Cronin ([facility]) and discussed pt's case. Dr. Cronin agreed with plan to admit the patient to tele obs for further evaluation and management.    1614 Rechecked the patient who is resting comfortably and in NAD. Patient is stable. BP- 127/74 HR- 86 Temp- 97 °F (36.1 °C) (Tympanic) O2 sat- 96%. Informed the patient of her test results. Informed the patient of her negative acute CT Head and CXR. Informed the patient of her unremarkable UA and blood work that shows sodium of 133.  Discussed the plan for admission for further evaluation and management. Pt understands and agrees with the plan, all questions answered.    MEDICAL DECISION MAKING  Results were reviewed/discussed with the patient and they were also made aware of online access. Pt also made aware that some labs, such as cultures, will not be resulted during ER visit and follow up with PMD is necessary.     MDM  Number of Diagnoses or Management Options  Hyponatremia:   Non-intractable vomiting with nausea, unspecified vomiting type:   Syncope and collapse:      Amount and/or Complexity of Data Reviewed  Clinical lab tests: ordered and reviewed (Sodium: 133 and Creatinine: 1.08)  Tests in the radiology section of CPT®: ordered and reviewed (Ct Head Without Contrast: The CT scan was performed as an emergency procedure through the brain without contrast. There is mild  diffuse atrophy and chronic small vessel ischemic change. There is no evidence of intracranial hemorrhage or mass effect and the visualized sinuses are clear. Xr Chest 1 View: The lungs are well-expanded and clear and the heart size is normal. No acute abnormality is seen. There may be a small hiatus hernia.)  Tests in the medicine section of CPT®: reviewed and ordered (See procedure notes for EKG interpretation.)  Discussion of test results with the performing providers: yes (Dr. Casanova (Radiologist))  Decide to obtain previous medical records or to obtain history from someone other than the patient: yes  Obtain history from someone other than the patient: yes (Patient's spouse and patient's daughter)  Review and summarize past medical records: yes (The patient has no previous ER visits in Epic.)  Discuss the patient with other providers: yes (Dr. Cronin ([facility]))  Independent visualization of images, tracings, or specimens: yes    Patient Progress  Patient progress: stable         DIAGNOSIS  Final diagnoses:   Syncope and collapse   Hyponatremia   Non-intractable vomiting with nausea, unspecified vomiting type       DISPOSITION  ADMISSION TO [facility]    Discussed treatment plan and reason for admission with pt/family and admitting physician.  Pt/family voiced understanding of the plan for admission for further testing/treatment as needed.    Latest Documented Vital Signs:  As of 3:22 PM  BP- 122/78 HR- 88 Temp- 97 °F (36.1 °C) (Tympanic) O2 sat- 95%    --  Documentation assistance provided by jayne Isaacs for Dr. Krystle Herrera MD.  Information recorded by the scribe was done at my direction and has been verified and validated by me.     Kayla Isaacs  11/16/19 3056       Krystle Herrera MD  11/18/19 [id number]

## 2019-11-16 NOTE — PROGRESS NOTES
"Pharmacy Consult - Lovenox    Mariana Gilbert has been consulted for pharmacy to dose enoxaparin for VTE PPX per Vel Cronin's request.         Relevant clinical data and objective history reviewed:  69 y.o. female 160 cm (63\") 66 kg (145 lb 8 oz)        Past Medical History:   Diagnosis Date   • Arthritis    • Depression    • Exertional dyspnea    • Fatigue    • GERD (gastroesophageal reflux disease)    • Hyperlipidemia    • Hypothyroidism    • IBS (irritable bowel syndrome)    • Seasonal allergies      is allergic to sulfa antibiotics.    Lab Results   Component Value Date    WBC 8.57 11/16/2019    HGB 14.6 11/16/2019    HCT 44.3 11/16/2019    MCV 90.4 11/16/2019     11/16/2019     Lab Results   Component Value Date    GLUCOSE 89 11/16/2019    CALCIUM 9.7 11/16/2019     (L) 11/16/2019    K 3.6 11/16/2019    CO2 22.2 11/16/2019    CL 97 (L) 11/16/2019    BUN 11 11/16/2019    CREATININE 1.08 (H) 11/16/2019    EGFRIFNONA 50 (L) 11/16/2019    BCR 10.2 11/16/2019    ANIONGAP 13.8 11/16/2019       Estimated Creatinine Clearance: 44.9 mL/min (A) (by C-G formula based on SCr of 1.08 mg/dL (H)).        Assessment/Plan    1. Will start patient on Lovenox 40 mg SQ every q24h hours based on the indication of VTE PPX, adjusted for renal function.    2. Platelet count is currently 261 at baseline. Platelets should be drawn every 3 days while the patient is on Lovenox per protocol.    3. The Pharmacy to Dose Consult will be discontinued at this time. Pharmacy will continue to follow the patient while on Lovenox and make any necessary adjustments.      Nathalia Del Valle [facility]  "

## 2019-11-17 ENCOUNTER — DOCUMENTATION (OUTPATIENT)
Dept: SURGERY | Facility: CLINIC | Age: 69
End: 2019-11-17

## 2019-11-17 LAB
ANION GAP SERPL CALCULATED.3IONS-SCNC: 11.6 MMOL/L (ref 5–15)
BUN BLD-MCNC: 8 MG/DL (ref 8–23)
BUN/CREAT SERPL: 11 (ref 7–25)
CALCIUM SPEC-SCNC: 8.2 MG/DL (ref 8.6–10.5)
CHLORIDE SERPL-SCNC: 112 MMOL/L (ref 98–107)
CO2 SERPL-SCNC: 18.4 MMOL/L (ref 22–29)
CORTIS SERPL-MCNC: 0.41 MCG/DL
CREAT BLD-MCNC: 0.73 MG/DL (ref 0.57–1)
DEPRECATED RDW RBC AUTO: 40.8 FL (ref 37–54)
ERYTHROCYTE [DISTWIDTH] IN BLOOD BY AUTOMATED COUNT: 12.4 % (ref 12.3–15.4)
GFR SERPL CREATININE-BSD FRML MDRD: 79 ML/MIN/1.73
GLUCOSE BLD-MCNC: 82 MG/DL (ref 65–99)
HCT VFR BLD AUTO: 36.5 % (ref 34–46.6)
HGB BLD-MCNC: 12.3 G/DL (ref 12–15.9)
MCH RBC QN AUTO: 30.4 PG (ref 26.6–33)
MCHC RBC AUTO-ENTMCNC: 33.7 G/DL (ref 31.5–35.7)
MCV RBC AUTO: 90.3 FL (ref 79–97)
PLATELET # BLD AUTO: 236 10*3/MM3 (ref 140–450)
PMV BLD AUTO: 9.8 FL (ref 6–12)
POTASSIUM BLD-SCNC: 3.9 MMOL/L (ref 3.5–5.2)
RBC # BLD AUTO: 4.04 10*6/MM3 (ref 3.77–5.28)
SODIUM BLD-SCNC: 142 MMOL/L (ref 136–145)
WBC NRBC COR # BLD: 6.45 10*3/MM3 (ref 3.4–10.8)

## 2019-11-17 PROCEDURE — 96360 HYDRATION IV INFUSION INIT: CPT

## 2019-11-17 PROCEDURE — G0378 HOSPITAL OBSERVATION PER HR: HCPCS

## 2019-11-17 PROCEDURE — 85027 COMPLETE CBC AUTOMATED: CPT | Performed by: HOSPITALIST

## 2019-11-17 PROCEDURE — 80048 BASIC METABOLIC PNL TOTAL CA: CPT | Performed by: HOSPITALIST

## 2019-11-17 PROCEDURE — 25010000002 ENOXAPARIN PER 10 MG: Performed by: HOSPITALIST

## 2019-11-17 PROCEDURE — 63710000001 PREDNISONE PER 5 MG: Performed by: HOSPITALIST

## 2019-11-17 PROCEDURE — 82533 TOTAL CORTISOL: CPT | Performed by: INTERNAL MEDICINE

## 2019-11-17 PROCEDURE — 96372 THER/PROPH/DIAG INJ SC/IM: CPT

## 2019-11-17 PROCEDURE — 96361 HYDRATE IV INFUSION ADD-ON: CPT

## 2019-11-17 RX ORDER — PRAMIPEXOLE DIHYDROCHLORIDE 0.5 MG/1
0.5 TABLET ORAL NIGHTLY
Status: DISCONTINUED | OUTPATIENT
Start: 2019-11-17 | End: 2019-11-18 | Stop reason: HOSPADM

## 2019-11-17 RX ORDER — UREA 10 %
3 LOTION (ML) TOPICAL NIGHTLY
Status: DISCONTINUED | OUTPATIENT
Start: 2019-11-17 | End: 2019-11-18 | Stop reason: HOSPADM

## 2019-11-17 RX ADMIN — Medication 1 CAPSULE: at 09:18

## 2019-11-17 RX ADMIN — SODIUM CHLORIDE 75 ML/HR: 9 INJECTION, SOLUTION INTRAVENOUS at 20:53

## 2019-11-17 RX ADMIN — Medication 3 MG: at 20:51

## 2019-11-17 RX ADMIN — ENOXAPARIN SODIUM 40 MG: 40 INJECTION SUBCUTANEOUS at 18:49

## 2019-11-17 RX ADMIN — Medication 2 WAFER: at 09:19

## 2019-11-17 RX ADMIN — PANTOPRAZOLE SODIUM 40 MG: 40 TABLET, DELAYED RELEASE ORAL at 06:10

## 2019-11-17 RX ADMIN — PRAMIPEXOLE DIHYDROCHLORIDE 0.5 MG: 0.5 TABLET ORAL at 20:51

## 2019-11-17 RX ADMIN — PRAMIPEXOLE DIHYDROCHLORIDE 0.12 MG: 0.25 TABLET ORAL at 09:18

## 2019-11-17 RX ADMIN — SODIUM CHLORIDE 125 ML/HR: 9 INJECTION, SOLUTION INTRAVENOUS at 13:14

## 2019-11-17 RX ADMIN — ESCITALOPRAM 20 MG: 20 TABLET, FILM COATED ORAL at 09:18

## 2019-11-17 RX ADMIN — PREDNISONE 2.5 MG: 2.5 TABLET ORAL at 09:18

## 2019-11-17 RX ADMIN — CETIRIZINE HYDROCHLORIDE 5 MG: 10 TABLET, FILM COATED ORAL at 09:19

## 2019-11-17 RX ADMIN — LEVOTHYROXINE SODIUM 75 MCG: 75 TABLET ORAL at 06:10

## 2019-11-17 NOTE — CONSULTS
[facility]  Consult Note                                                                              11/17/2019  Vel Cronin MD    Patient Identification:  Mariana Gilbert:   69 y.o.  female  1950     Date of Admission:11/16/2019    CC:  Consult requested by Dr. Cronin for evaluation of syncope.    History of Present Illness:  Patient is a 69-year-old female with history of pituitary tumor, status post removal with subsequent adrenal insufficiency, hypothyroidism, hyperlipidemia, GE reflux disease and irritable bowel syndrome.  She has seen Dr. Velázquez in the past for dyspnea on exertion in 2015 she had an echocardiogram that showed normal systolic function grade 1 diastolic dysfunction mitral any calcification.  She has a family history of brother and sister with severe ischemic cardiomyopathy as well as mother with myocardial infarction.  She also has a family history of aortic aneurysms.  In January 2016 showed stress perfusion study that was negative for ischemia with an ejection fraction 73%.  When seen in March 2016 she was feeling well.    She has been recovering from the flu associated with loose bowel movements.  Yesterday while ambulating she got nauseated lightheadedness and near syncopal.  She felt her legs were shaking.  She denies any chest pain, palpitations syncope near syncope or shortness of breath.  However she states that over the past 6 months she has had severe exertional dyspnea on exertion.  On arrival to the emergency room she was slightly hypotensive.  Blood blood work demonstrated troponin has been negative.  Creatinine slightly elevated at 1.03 with a GFR 50 the overnight this is improved.  Hemoglobin down to 12.3 with hydration.  Urinalysis slightly abnormal.  EKG unchanged chest x-ray unchanged    This morning after going up to the bathroom and having a diarrheal stool while she was resting in bed she had another episode of weakness though this occurred in bed.   Telemetry did not show any arrhythmia.  Orthostatics were ordered but I do not see them resulted in the vital section.    Past Medical History:  Past Medical History:   Diagnosis Date   • Arthritis    • Depression    • Exertional dyspnea    • Fatigue    • GERD (gastroesophageal reflux disease)    • Hyperlipidemia    • Hypothyroidism    • IBS (irritable bowel syndrome)    • Seasonal allergies        Past Surgical History:  Past Surgical History:   Procedure Laterality Date   • BREAST BIOPSY Bilateral 1970s    benign pathology   • COLONOSCOPY N/A 01/2018    done at [facility]   • PARTIAL HYSTERECTOMY Bilateral 1978    Bilateral ovaries in tact-Dr. Ulrich   • PITUITARY EXCISION  1990    partial       Allergies:  Allergies   Allergen Reactions   • Sulfa Antibiotics Rash       Home Meds:  Medications Prior to Admission   Medication Sig Dispense Refill Last Dose   • cetirizine (zyrTEC) 10 MG tablet Take 10 mg by mouth Daily.   11/15/2019 at Unknown time   • Difluprednate 0.05 % emulsion Administer 1 drop to the right eye Daily.   11/15/2019 at Unknown time   • escitalopram (LEXAPRO) 20 MG tablet Take 20 mg by mouth Daily.   11/16/2019 at Unknown time   • fexofenadine (ALLEGRA) 180 MG tablet Take 180 mg by mouth Daily.   11/16/2019 at Unknown time   • levothyroxine (SYNTHROID, LEVOTHROID) 75 MCG tablet Take 75 mcg by mouth Daily.   11/16/2019 at Unknown time   • pantoprazole (PROTONIX) 40 MG EC tablet Take 40 mg by mouth Daily.   11/16/2019 at Unknown time   • pramipexole (MIRAPEX) 0.125 MG tablet Take 0.125 mg by mouth Daily.   11/15/2019 at 2100   • predniSONE (DELTASONE) 2.5 MG tablet Take 2.5 mg by mouth Daily.   11/16/2019 at Unknown time   • Probiotic Product (PROBIOTIC PO) Take 1 capsule by mouth Daily. Culturelle OTC   11/16/2019 at Unknown time   • psyllium (METAMUCIL) 58.6 % packet Take 1 packet by mouth Daily.   11/16/2019 at Unknown time   • timolol (TIMOPTIC) 0.25 % ophthalmic solution Administer 1 drop  into the left eye Daily.        Current Meds  Scheduled Meds:  cetirizine 5 mg Oral Daily   enoxaparin 40 mg Subcutaneous Q24H   escitalopram 20 mg Oral Daily   lactobacillus acidophilus 1 capsule Oral Daily   levothyroxine 75 mcg Oral Q AM   METAMUCIL 2 wafer Oral Daily   pantoprazole 40 mg Oral Q AM   pramipexole 0.125 mg Oral Daily   predniSONE 2.5 mg Oral Daily     Continuous Infusions:  sodium chloride 125 mL/hr Last Rate: 125 mL/hr (19 1314)     Social History:   Social History     Socioeconomic History   • Marital status:      Spouse name: Not on file   • Number of children: Not on file   • Years of education: Not on file   • Highest education level: Not on file   Tobacco Use   • Smoking status: Former Smoker     Packs/day: 1.00     Years: 30.00     Pack years: 30.00     Types: Cigarettes     Last attempt to quit: 2000     Years since quittin.8   • Smokeless tobacco: Never Used   Substance and Sexual Activity   • Alcohol use: Yes     Comment: social, 1-2 drinks occassionally   • Drug use: No   • Sexual activity: Defer       Family History:  Family History   Problem Relation Age of Onset   • Heart disease Mother    • Aortic aneurysm Sister    • Heart disease Sister    • Breast cancer Sister    • Aortic aneurysm Brother    • Heart disease Brother    • Stroke Brother         CVA   • Hypertension Other        REVIEW OF SYSTEMS:   CONSTITUTIONAL: No weight loss, fever, chills  HEENT: Eyes: No visual loss, blurred vision, double vision or yellow sclerae. Ears, Nose, Throat: No hearing loss, sneezing, congestion, runny nose or sore throat.   SKIN: No rash or itching.     RESPIRATORY: No hemoptysis, cough or sputum.   GASTROINTESTINAL: No anorexia, nausea, vomiting or diarrhea. No abdominal pain, bright red blood per rectum or melena.  GENITOURINARY: No burning on urination, hematuria or increased frequency.  NEUROLOGICAL: No headache,aralysis, ataxia, numbness or tingling in the extremities. No  "change in bowel or bladder control.   MUSCULOSKELETAL: No muscle, back pain, joint pain or stiffness.   HEMATOLOGIC: No anemia, bleeding or bruising.   LYMPHATICS: No enlarged nodes. No history of splenectomy.   PSYCHIATRIC: No history of depression, anxiety, hallucinations.   ENDOCRINOLOGIC: No reports of sweating, cold or heat intolerance. No polyuria or polydipsia.     Physical Exam    /75 (BP Location: Left arm, Patient Position: Lying)   Pulse 93   Temp 98.1 °F (36.7 °C) (Oral)   Resp 18   Ht 160 cm (63\")   Wt 66 kg (145 lb 8.1 oz)   SpO2 95%   BMI 25.77 kg/m²     General Appearance Well developed, cooperative and well nourished and no acute distress   Head Normocephalic, without abnormality, atraumatic   Ears Ears appear intact with no abnormalities noted   Throat No oral lesions, no thrush, oral mucosa moist   Neck No adenopathy, supple, trachea midline, no thyromegaly, no carotid bruit, no JVD   Back No skin lesions, erythema or scars, no tenderness to percussion or palpation,range of motion is normal   Lungs Clear to auscultation,respirations regular, even and unlabored   Heart Regular rhythm and normal rate, normal S1 and S2, no murmur, no gallop, no rub, no click   Chest wall No abnormalities observed   Abdomen Normal bowel sounds, no masses, no hepatomegaly,    Extremities Moves all extremities well, no edema, no cyanosis, no redness   Pulses Pulses palpable and equal bilaterally. Normal radial, carotid, femoral, dorsalis pedis and posterior tibial pulses bilaterally. Normal abdominal aorta   Skin No bleeding, bruising or rash   Psychiatric Alert and oriented x 3, normal mood and affect    Results from last 7 days   Lab Units 11/17/19  0534 11/16/19  1316   SODIUM mmol/L 142 133*   POTASSIUM mmol/L 3.9 3.6   CHLORIDE mmol/L 112* 97*   CO2 mmol/L 18.4* 22.2   BUN mg/dL 8 11   CREATININE mg/dL 0.73 1.08*   CALCIUM mg/dL 8.2* 9.7   BILIRUBIN mg/dL  --  0.8   ALK PHOS U/L  --  93   ALT (SGPT) " U/L  --  27   AST (SGOT) U/L  --  30   GLUCOSE mg/dL 82 89     Results from last 7 days   Lab Units 11/16/19  1316   TROPONIN T ng/mL <0.010     Results from last 7 days   Lab Units 11/17/19  0534 11/16/19  1316   WBC 10*3/mm3 6.45 8.57   HEMOGLOBIN g/dL 12.3 14.6   HEMATOCRIT % 36.5 44.3   PLATELETS 10*3/mm3 236 261     Results from last 7 days   Lab Units 11/16/19  1316   MAGNESIUM mg/dL 2.2     I personally viewed and interpreted the patient's EKG/Telemetry data    Assessment and Plan  1.  Syncope.  Suspect due to vasovagal tendencies exacerbated by diarrhea, recent flu shot and underlying adrenal insufficiency.  Would continue with hydration, recheck orthostatic pressures in a.m.  Given exertional dyspnea symptoms of the past several months also check an echocardiogram.  Would also address adrenal insufficiency further.  2.  Adrenal insufficiency.  It is been 2 years since she saw Dr. Collins Gerard.  Would ask endocrinology to address in hospital.  3.  Exertional dyspnea.  We will check an echocardiogram.  She also has a strong family history of ischemic cardiomyopathy.  With this in mind we will check a Lexiscan cardiac stress test in a.m.  5.  Hypothyroidism  6.  Hyperlipidemia with family history of premature atherosclerotic disease      I have reviewed HPI and ROS above.    M[employer]  11/17/2019  1:30 PM    80min spent in reviewing records, discussion and examination of the patient and discussion with other members of the patient's medical team.     Dictated utilizing Dragon dictation

## 2019-11-17 NOTE — PROGRESS NOTES
"DAILY PROGRESS NOTE  [facility]    Patient Identification:  Name: Mariana Gilbert  Age: 69 y.o.  Sex: female  :  1950  MRN: [id number]         Primary Care Physician: Racheal Adams APRN    Subjective:  Interval History:She feels better today.    Objective:    Scheduled Meds:  cetirizine 5 mg Oral Daily   enoxaparin 40 mg Subcutaneous Q24H   escitalopram 20 mg Oral Daily   lactobacillus acidophilus 1 capsule Oral Daily   levothyroxine 75 mcg Oral Q AM   METAMUCIL 2 wafer Oral Daily   pantoprazole 40 mg Oral Q AM   pramipexole 0.125 mg Oral Daily   predniSONE 2.5 mg Oral Daily     Continuous Infusions:  sodium chloride 125 mL/hr Last Rate: 125 mL/hr (19 1314)       Vital signs in last 24 hours:  Temp:  [97.8 °F (36.6 °C)-99 °F (37.2 °C)] 97.8 °F (36.6 °C)  Heart Rate:  [] 93  Resp:  [16-18] 18  BP: (127-170)/() 140/101    Intake/Output:    Intake/Output Summary (Last 24 hours) at 2019 1537  Last data filed at 2019 1400  Gross per 24 hour   Intake 2370 ml   Output --   Net 2370 ml       Exam:  BP (!) 140/101 (BP Location: Left arm, Patient Position: Standing)   Pulse 93   Temp 97.8 °F (36.6 °C) (Oral)   Resp 18   Ht 160 cm (63\")   Wt 66 kg (145 lb 8.1 oz)   SpO2 95%   BMI 25.77 kg/m²     General Appearance:    Alert, cooperative, no distress   Head:    Normocephalic, without obvious abnormality, atraumatic   Eyes:       Throat:   Lips, tongue, gums normal   Neck:   Supple, symmetrical, trachea midline, no JVD   Lungs:     Clear to auscultation bilaterally, respirations unlabored   Chest Wall:    No tenderness or deformity    Heart:    Regular rate and rhythm, S1 and S2 normal, no murmur,no  Rub or gallop   Abdomen:     Soft, non-tender, bowel sounds active, no masses, no organomegaly    Extremities:   Extremities normal, atraumatic, no cyanosis or edema   Pulses:      Skin:   Skin is warm and dry,  no rashes or palpable lesions   Neurologic:   no " focal deficits noted      Lab Results (last 72 hours)     Procedure Component Value Units Date/Time    Basic Metabolic Panel [[id number]]  (Abnormal) Collected:  11/17/19 0534    Specimen:  Blood Updated:  11/17/19 0651     Glucose 82 mg/dL      BUN 8 mg/dL      Creatinine 0.73 mg/dL      Sodium 142 mmol/L      Potassium 3.9 mmol/L      Chloride 112 mmol/L      CO2 18.4 mmol/L      Calcium 8.2 mg/dL      eGFR Non African Amer 79 mL/min/1.73      BUN/Creatinine Ratio 11.0     Anion Gap 11.6 mmol/L     Narrative:       GFR Normal >60  Chronic Kidney Disease <60  Kidney Failure <15    CBC (No Diff) [[id number]]  (Normal) Collected:  11/17/19 0534    Specimen:  Blood Updated:  11/17/19 0615     WBC 6.45 10*3/mm3      RBC 4.04 10*6/mm3      Hemoglobin 12.3 g/dL      Hematocrit 36.5 %      MCV 90.3 fL      MCH 30.4 pg      MCHC 33.7 g/dL      RDW 12.4 %      RDW-SD 40.8 fl      MPV 9.8 fL      Platelets 236 10*3/mm3     Urinalysis, Microscopic Only - Urine, Catheter [[id number]]  (Abnormal) Collected:  11/16/19 1359    Specimen:  Urine, Catheter Updated:  11/16/19 1450     RBC, UA None Seen /HPF      WBC, UA 0-2 /HPF      Bacteria, UA Trace /HPF      Squamous Epithelial Cells, UA 0-2 /HPF      Hyaline Casts, UA Too Numerous to Count /LPF      Methodology Manual Light Microscopy    Urinalysis With Microscopic If Indicated (No Culture) - Urine, Catheter [[id number]]  (Abnormal) Collected:  11/16/19 1359    Specimen:  Urine, Catheter Updated:  11/16/19 1434     Color, UA Yellow     Appearance, UA Cloudy     pH, UA <=5.0     Specific Gravity, UA 1.019     Glucose, UA Negative     Ketones, UA Trace     Bilirubin, UA Negative     Blood, UA Negative     Protein, UA Trace     Leuk Esterase, UA Trace     Nitrite, UA Negative     Urobilinogen, UA 0.2 E.U./dL    [place] Draw [[id number]] Collected:  11/16/19 1316    Specimen:  Blood Updated:  11/16/19 1431    Narrative:       The following orders were created for panel order [place]  Draw.  Procedure                               Abnormality         Status                     ---------                               -----------         ------                     Light Blue Top[[id number]]                                   Final result               Green Top (Gel)[[id number]]                                  Final result               Lavender Top[[id number]]                                     Final result               Gold Top - SST[[id number]]                                   Final result                 Please view results for these tests on the individual orders.    Light Blue Top [[id number]] Collected:  11/16/19 1316    Specimen:  Blood Updated:  11/16/19 1431     Extra Tube hold for add-on     Comment: Auto resulted       Green Top (Gel) [[id number]] Collected:  11/16/19 1316    Specimen:  Blood Updated:  11/16/19 1431     Extra Tube Hold for add-ons.     Comment: Auto resulted.       Lavender Top [[id number]] Collected:  11/16/19 1316    Specimen:  Blood Updated:  11/16/19 1431     Extra Tube hold for add-on     Comment: Auto resulted       Gold Top - SST [[id number]] Collected:  11/16/19 1316    Specimen:  Blood Updated:  11/16/19 1431     Extra Tube Hold for add-ons.     Comment: Auto resulted.       Comprehensive Metabolic Panel [[id number]]  (Abnormal) Collected:  11/16/19 1316    Specimen:  Blood Updated:  11/16/19 1357     Glucose 89 mg/dL      BUN 11 mg/dL      Creatinine 1.08 mg/dL      Sodium 133 mmol/L      Potassium 3.6 mmol/L      Chloride 97 mmol/L      CO2 22.2 mmol/L      Calcium 9.7 mg/dL      Total Protein 7.2 g/dL      Albumin 4.40 g/dL      ALT (SGPT) 27 U/L      AST (SGOT) 30 U/L      Alkaline Phosphatase 93 U/L      Total Bilirubin 0.8 mg/dL      eGFR Non African Amer 50 mL/min/1.73      Globulin 2.8 gm/dL      A/G Ratio 1.6 g/dL      BUN/Creatinine Ratio 10.2     Anion Gap 13.8 mmol/L     Narrative:       GFR Normal >60  Chronic Kidney Disease <60  Kidney Failure <15     Troponin [[id number]]  (Normal) Collected:  11/16/19 1316    Specimen:  Blood Updated:  11/16/19 1356     Troponin T <0.010 ng/mL     Narrative:       Troponin T Reference Range:  <= 0.03 ng/mL-   Negative for AMI  >0.03 ng/mL-     Abnormal for myocardial necrosis.  Clinicians would have to utilize clinical acumen, EKG, Troponin and serial changes to determine if it is an Acute Myocardial Infarction or myocardial injury due to an underlying chronic condition.     Magnesium [[id number]]  (Normal) Collected:  11/16/19 1316    Specimen:  Blood Updated:  11/16/19 1356     Magnesium 2.2 mg/dL     CBC & Differential [[id number]] Collected:  11/16/19 1316    Specimen:  Blood Updated:  11/16/19 1337    Narrative:       The following orders were created for panel order CBC & Differential.  Procedure                               Abnormality         Status                     ---------                               -----------         ------                     CBC Auto Differential[[id number]]        Abnormal            Final result                 Please view results for these tests on the individual orders.    CBC Auto Differential [[id number]]  (Abnormal) Collected:  11/16/19 1316    Specimen:  Blood Updated:  11/16/19 1337     WBC 8.57 10*3/mm3      RBC 4.90 10*6/mm3      Hemoglobin 14.6 g/dL      Hematocrit 44.3 %      MCV 90.4 fL      MCH 29.8 pg      MCHC 33.0 g/dL      RDW 12.6 %      RDW-SD 41.7 fl      MPV 9.7 fL      Platelets 261 10*3/mm3      Neutrophil % 35.9 %      Lymphocyte % 48.1 %      Monocyte % 12.1 %      Eosinophil % 2.9 %      Basophil % 0.8 %      Immature Grans % 0.2 %      Neutrophils, Absolute 3.07 10*3/mm3      Lymphocytes, Absolute 4.12 10*3/mm3      Monocytes, Absolute 1.04 10*3/mm3      Eosinophils, Absolute 0.25 10*3/mm3      Basophils, Absolute 0.07 10*3/mm3      Immature Grans, Absolute 0.02 10*3/mm3      nRBC 0.0 /100 WBC     POC Occult Blood Stool [[id number]]  (Normal) Collected:  11/16/19  1232    Specimen:  Stool from Per Rectum Updated:  11/16/19 1233     Fecal Occult Blood Negative     Lot Number 128     Expiration Date 05/31/2020     Positive Control Positive     Negative Control Negative    POC Glucose Once [[id number]]  (Normal) Collected:  11/16/19 1227    Specimen:  Blood Updated:  11/16/19 1229     Glucose 91 mg/dL         Data Review:  Results from last 7 days   Lab Units 11/17/19  0534 11/16/19  1316   SODIUM mmol/L 142 133*   POTASSIUM mmol/L 3.9 3.6   CHLORIDE mmol/L 112* 97*   CO2 mmol/L 18.4* 22.2   BUN mg/dL 8 11   CREATININE mg/dL 0.73 1.08*   GLUCOSE mg/dL 82 89   CALCIUM mg/dL 8.2* 9.7     Results from last 7 days   Lab Units 11/17/19  0534 11/16/19  1316   WBC 10*3/mm3 6.45 8.57   HEMOGLOBIN g/dL 12.3 14.6   HEMATOCRIT % 36.5 44.3   PLATELETS 10*3/mm3 236 261             Lab Results   Lab Value Date/Time    TROPONINT <0.010 11/16/2019 1316         Results from last 7 days   Lab Units 11/16/19  1316   ALK PHOS U/L 93   BILIRUBIN mg/dL 0.8   ALT (SGPT) U/L 27   AST (SGOT) U/L 30             Glucose   Date/Time Value Ref Range Status   11/16/2019 1227 91 70 - 130 mg/dL Final           Past Medical History:   Diagnosis Date   • Arthritis    • Depression    • Exertional dyspnea    • Fatigue    • GERD (gastroesophageal reflux disease)    • Hyperlipidemia    • Hypothyroidism    • IBS (irritable bowel syndrome)    • Seasonal allergies        Assessment:  Active Hospital Problems    Diagnosis  POA   • **Syncope and collapse [R55]  Yes   • Hypothyroidism [E03.9]  Yes   • Hyperlipidemia [E78.5]  Yes      Resolved Hospital Problems   No resolved problems to display.       Plan:  Continue IV fluids and get stress test tomorrow. Endocrine consult. Follow labs.    Zafar Haines MD  11/17/2019  3:37 PM

## 2019-11-17 NOTE — PLAN OF CARE
Problem: Pain, Chronic (Adult)  Goal: Identify Related Risk Factors and Signs and Symptoms  Outcome: Ongoing (interventions implemented as appropriate)   11/17/19 0554   Pain, Chronic (Adult)   Related Risk Factors (Chronic Pain) disease process       Problem: Patient Care Overview  Goal: Plan of Care Review  Outcome: Ongoing (interventions implemented as appropriate)   11/17/19 0554   Coping/Psychosocial   Plan of Care Reviewed With patient   Plan of Care Review   Progress improving   OTHER   Outcome Summary Condition stable.       Problem: Skin Injury Risk (Adult)  Goal: Identify Related Risk Factors and Signs and Symptoms  Outcome: Ongoing (interventions implemented as appropriate)   11/17/19 0558   Skin Injury Risk (Adult)   Related Risk Factors (Skin Injury Risk) mobility impaired

## 2019-11-18 ENCOUNTER — APPOINTMENT (OUTPATIENT)
Dept: NUCLEAR MEDICINE | Facility: HOSPITAL | Age: 69
End: 2019-11-18

## 2019-11-18 ENCOUNTER — APPOINTMENT (OUTPATIENT)
Dept: CARDIOLOGY | Facility: HOSPITAL | Age: 69
End: 2019-11-18

## 2019-11-18 VITALS
BODY MASS INDEX: 25.78 KG/M2 | WEIGHT: 145.5 LBS | RESPIRATION RATE: 18 BRPM | HEIGHT: 63 IN | DIASTOLIC BLOOD PRESSURE: 93 MMHG | SYSTOLIC BLOOD PRESSURE: 144 MMHG | TEMPERATURE: 98.2 F | OXYGEN SATURATION: 92 % | HEART RATE: 107 BPM

## 2019-11-18 PROBLEM — E27.1 ADRENAL INSUFFICIENCY (ADDISON'S DISEASE): Status: ACTIVE | Noted: 2019-11-18

## 2019-11-18 LAB
ANION GAP SERPL CALCULATED.3IONS-SCNC: 12.4 MMOL/L (ref 5–15)
AORTIC DIMENSIONLESS INDEX: 0.8 (DI)
BASOPHILS # BLD MANUAL: 0.07 10*3/MM3 (ref 0–0.2)
BASOPHILS NFR BLD AUTO: 1 % (ref 0–1.5)
BH CV ECHO MEAS - ACS: 1.7 CM
BH CV ECHO MEAS - AO MAX PG (FULL): 2.2 MMHG
BH CV ECHO MEAS - AO MAX PG: 5.7 MMHG
BH CV ECHO MEAS - AO MEAN PG (FULL): 2 MMHG
BH CV ECHO MEAS - AO MEAN PG: 3 MMHG
BH CV ECHO MEAS - AO ROOT AREA (BSA CORRECTED): 1.8
BH CV ECHO MEAS - AO ROOT AREA: 7.5 CM^2
BH CV ECHO MEAS - AO ROOT DIAM: 3.1 CM
BH CV ECHO MEAS - AO V2 MAX: 119 CM/SEC
BH CV ECHO MEAS - AO V2 MEAN: 77.4 CM/SEC
BH CV ECHO MEAS - AO V2 VTI: 22.8 CM
BH CV ECHO MEAS - AVA(I,A): 2.4 CM^2
BH CV ECHO MEAS - AVA(I,D): 2.4 CM^2
BH CV ECHO MEAS - AVA(V,A): 2.4 CM^2
BH CV ECHO MEAS - AVA(V,D): 2.4 CM^2
BH CV ECHO MEAS - BSA(HAYCOCK): 1.7 M^2
BH CV ECHO MEAS - BSA: 1.7 M^2
BH CV ECHO MEAS - BZI_BMI: 25.8 KILOGRAMS/M^2
BH CV ECHO MEAS - BZI_METRIC_HEIGHT: 160 CM
BH CV ECHO MEAS - BZI_METRIC_WEIGHT: 66 KG
BH CV ECHO MEAS - EDV(CUBED): 59.3 ML
BH CV ECHO MEAS - EDV(MOD-SP2): 22 ML
BH CV ECHO MEAS - EDV(MOD-SP4): 25 ML
BH CV ECHO MEAS - EDV(TEICH): 65.9 ML
BH CV ECHO MEAS - EF(CUBED): 76.7 %
BH CV ECHO MEAS - EF(MOD-BP): 63 %
BH CV ECHO MEAS - EF(MOD-SP2): 59.1 %
BH CV ECHO MEAS - EF(MOD-SP4): 64 %
BH CV ECHO MEAS - EF(TEICH): 69.4 %
BH CV ECHO MEAS - ESV(CUBED): 13.8 ML
BH CV ECHO MEAS - ESV(MOD-SP2): 9 ML
BH CV ECHO MEAS - ESV(MOD-SP4): 9 ML
BH CV ECHO MEAS - ESV(TEICH): 20.2 ML
BH CV ECHO MEAS - FS: 38.5 %
BH CV ECHO MEAS - IVS/LVPW: 1
BH CV ECHO MEAS - IVSD: 0.7 CM
BH CV ECHO MEAS - LAT PEAK E' VEL: 12.2 CM/SEC
BH CV ECHO MEAS - LV DIASTOLIC VOL/BSA (35-75): 14.8 ML/M^2
BH CV ECHO MEAS - LV MASS(C)D: 75.1 GRAMS
BH CV ECHO MEAS - LV MASS(C)DI: 44.5 GRAMS/M^2
BH CV ECHO MEAS - LV MAX PG: 3.4 MMHG
BH CV ECHO MEAS - LV MEAN PG: 1 MMHG
BH CV ECHO MEAS - LV SYSTOLIC VOL/BSA (12-30): 5.3 ML/M^2
BH CV ECHO MEAS - LV V1 MAX: 92.4 CM/SEC
BH CV ECHO MEAS - LV V1 MEAN: 50.8 CM/SEC
BH CV ECHO MEAS - LV V1 VTI: 17.1 CM
BH CV ECHO MEAS - LVIDD: 3.9 CM
BH CV ECHO MEAS - LVIDS: 2.4 CM
BH CV ECHO MEAS - LVLD AP2: 6.4 CM
BH CV ECHO MEAS - LVLD AP4: 5.8 CM
BH CV ECHO MEAS - LVLS AP2: 4.8 CM
BH CV ECHO MEAS - LVLS AP4: 4.8 CM
BH CV ECHO MEAS - LVOT AREA (M): 3.1 CM^2
BH CV ECHO MEAS - LVOT AREA: 3.1 CM^2
BH CV ECHO MEAS - LVOT DIAM: 2 CM
BH CV ECHO MEAS - LVPWD: 0.7 CM
BH CV ECHO MEAS - MED PEAK E' VEL: 6.31 CM/SEC
BH CV ECHO MEAS - MV A DUR: 127 SEC
BH CV ECHO MEAS - MV A MAX VEL: 108 CM/SEC
BH CV ECHO MEAS - MV DEC SLOPE: 771 CM/SEC^2
BH CV ECHO MEAS - MV DEC TIME: 145 SEC
BH CV ECHO MEAS - MV E MAX VEL: 78.6 CM/SEC
BH CV ECHO MEAS - MV E/A: 0.73
BH CV ECHO MEAS - MV MAX PG: 4.2 MMHG
BH CV ECHO MEAS - MV MEAN PG: 2 MMHG
BH CV ECHO MEAS - MV P1/2T MAX VEL: 92.3 CM/SEC
BH CV ECHO MEAS - MV P1/2T: 35.1 MSEC
BH CV ECHO MEAS - MV V2 MAX: 102 CM/SEC
BH CV ECHO MEAS - MV V2 MEAN: 73.7 CM/SEC
BH CV ECHO MEAS - MV V2 VTI: 19.2 CM
BH CV ECHO MEAS - MVA P1/2T LCG: 2.4 CM^2
BH CV ECHO MEAS - MVA(P1/2T): 6.3 CM^2
BH CV ECHO MEAS - MVA(VTI): 2.8 CM^2
BH CV ECHO MEAS - PA ACC TIME: 0.12 SEC
BH CV ECHO MEAS - PA MAX PG (FULL): 1.5 MMHG
BH CV ECHO MEAS - PA MAX PG: 4.2 MMHG
BH CV ECHO MEAS - PA PR(ACCEL): 23.2 MMHG
BH CV ECHO MEAS - PA V2 MAX: 103 CM/SEC
BH CV ECHO MEAS - PULM A REVS VEL: 28.1 CM/SEC
BH CV ECHO MEAS - PULM DIAS VEL: 41.7 CM/SEC
BH CV ECHO MEAS - PULM S/D: 1.3
BH CV ECHO MEAS - PULM SYS VEL: 55.3 CM/SEC
BH CV ECHO MEAS - RV MAX PG: 2.7 MMHG
BH CV ECHO MEAS - RV MEAN PG: 2 MMHG
BH CV ECHO MEAS - RV V1 MAX: 82.8 CM/SEC
BH CV ECHO MEAS - RV V1 MEAN: 62 CM/SEC
BH CV ECHO MEAS - RV V1 VTI: 17 CM
BH CV ECHO MEAS - SI(AO): 101.9 ML/M^2
BH CV ECHO MEAS - SI(CUBED): 26.9 ML/M^2
BH CV ECHO MEAS - SI(LVOT): 31.8 ML/M^2
BH CV ECHO MEAS - SI(MOD-SP2): 7.7 ML/M^2
BH CV ECHO MEAS - SI(MOD-SP4): 9.5 ML/M^2
BH CV ECHO MEAS - SI(TEICH): 27.1 ML/M^2
BH CV ECHO MEAS - SV(AO): 172.1 ML
BH CV ECHO MEAS - SV(CUBED): 45.5 ML
BH CV ECHO MEAS - SV(LVOT): 53.7 ML
BH CV ECHO MEAS - SV(MOD-SP2): 13 ML
BH CV ECHO MEAS - SV(MOD-SP4): 16 ML
BH CV ECHO MEAS - SV(TEICH): 45.8 ML
BH CV ECHO MEAS - TAPSE (>1.6): 2.9 CM2
BH CV ECHO MEASUREMENTS AVERAGE E/E' RATIO: 8.49
BH CV STRESS COMMENTS STAGE 1: NORMAL
BH CV STRESS DOSE REGADENOSON STAGE 1: 0.4
BH CV STRESS DURATION MIN STAGE 1: 0
BH CV STRESS DURATION SEC STAGE 1: 10
BH CV STRESS PROTOCOL 1: NORMAL
BH CV STRESS RECOVERY BP: NORMAL MMHG
BH CV STRESS RECOVERY HR: 98 BPM
BH CV STRESS STAGE 1: 1
BH CV XLRA - RV BASE: 3 CM
BH CV XLRA - TDI S': 13.2 CM/SEC
BUN BLD-MCNC: 5 MG/DL (ref 8–23)
BUN/CREAT SERPL: 7.8 (ref 7–25)
CALCIUM SPEC-SCNC: 8.7 MG/DL (ref 8.6–10.5)
CHLORIDE SERPL-SCNC: 113 MMOL/L (ref 98–107)
CHOLEST SERPL-MCNC: 280 MG/DL (ref 0–200)
CO2 SERPL-SCNC: 20.6 MMOL/L (ref 22–29)
CORTIS SERPL-MCNC: <0.11 MCG/DL
CREAT BLD-MCNC: 0.64 MG/DL (ref 0.57–1)
DEPRECATED RDW RBC AUTO: 41.4 FL (ref 37–54)
EOSINOPHIL # BLD MANUAL: 0.28 10*3/MM3 (ref 0–0.4)
EOSINOPHIL NFR BLD MANUAL: 4 % (ref 0.3–6.2)
ERYTHROCYTE [DISTWIDTH] IN BLOOD BY AUTOMATED COUNT: 12.6 % (ref 12.3–15.4)
GFR SERPL CREATININE-BSD FRML MDRD: 92 ML/MIN/1.73
GLUCOSE BLD-MCNC: 90 MG/DL (ref 65–99)
HCT VFR BLD AUTO: 40.6 % (ref 34–46.6)
HDLC SERPL-MCNC: 31 MG/DL (ref 40–60)
HGB BLD-MCNC: 12.9 G/DL (ref 12–15.9)
LDLC SERPL CALC-MCNC: 184 MG/DL (ref 0–100)
LDLC/HDLC SERPL: 5.95 {RATIO}
LEFT ATRIUM VOLUME INDEX: 15.9 ML/M2
LV EF 2D ECHO EST: 63 %
LV EF NUC BP: 70 %
LYMPHOCYTES # BLD MANUAL: 3.35 10*3/MM3 (ref 0.7–3.1)
LYMPHOCYTES NFR BLD MANUAL: 2 % (ref 5–12)
LYMPHOCYTES NFR BLD MANUAL: 47.5 % (ref 19.6–45.3)
MAXIMAL PREDICTED HEART RATE: 151 BPM
MAXIMAL PREDICTED HEART RATE: 151 BPM
MCH RBC QN AUTO: 28.8 PG (ref 26.6–33)
MCHC RBC AUTO-ENTMCNC: 31.8 G/DL (ref 31.5–35.7)
MCV RBC AUTO: 90.6 FL (ref 79–97)
MONOCYTES # BLD AUTO: 0.14 10*3/MM3 (ref 0.1–0.9)
NEUTROPHILS # BLD AUTO: 3.21 10*3/MM3 (ref 1.7–7)
NEUTROPHILS NFR BLD MANUAL: 45.5 % (ref 42.7–76)
PERCENT MAX PREDICTED HR: 74.17 %
PLAT MORPH BLD: NORMAL
PLATELET # BLD AUTO: 267 10*3/MM3 (ref 140–450)
PMV BLD AUTO: 10 FL (ref 6–12)
POTASSIUM BLD-SCNC: 4 MMOL/L (ref 3.5–5.2)
RBC # BLD AUTO: 4.48 10*6/MM3 (ref 3.77–5.28)
RBC MORPH BLD: NORMAL
SMUDGE CELLS BLD QL SMEAR: ABNORMAL
SODIUM BLD-SCNC: 146 MMOL/L (ref 136–145)
STRESS BASELINE BP: NORMAL MMHG
STRESS BASELINE HR: 79 BPM
STRESS PERCENT HR: 87 %
STRESS POST PEAK BP: NORMAL MMHG
STRESS POST PEAK HR: 112 BPM
STRESS TARGET HR: 128 BPM
STRESS TARGET HR: 128 BPM
T4 FREE SERPL-MCNC: 1.4 NG/DL (ref 0.93–1.7)
TRIGL SERPL-MCNC: 323 MG/DL (ref 0–150)
TSH SERPL DL<=0.05 MIU/L-ACNC: <0.005 UIU/ML (ref 0.27–4.2)
VLDLC SERPL-MCNC: 64.6 MG/DL (ref 5–40)
WBC NRBC COR # BLD: 7.05 10*3/MM3 (ref 3.4–10.8)

## 2019-11-18 PROCEDURE — 99204 OFFICE O/P NEW MOD 45 MIN: CPT | Performed by: INTERNAL MEDICINE

## 2019-11-18 PROCEDURE — 93016 CV STRESS TEST SUPVJ ONLY: CPT | Performed by: INTERNAL MEDICINE

## 2019-11-18 PROCEDURE — 84443 ASSAY THYROID STIM HORMONE: CPT | Performed by: INTERNAL MEDICINE

## 2019-11-18 PROCEDURE — 85025 COMPLETE CBC W/AUTO DIFF WBC: CPT | Performed by: HOSPITALIST

## 2019-11-18 PROCEDURE — 63710000001 PREDNISONE PER 5 MG: Performed by: INTERNAL MEDICINE

## 2019-11-18 PROCEDURE — 63710000001 PREDNISONE PER 5 MG: Performed by: HOSPITALIST

## 2019-11-18 PROCEDURE — 78452 HT MUSCLE IMAGE SPECT MULT: CPT

## 2019-11-18 PROCEDURE — 93017 CV STRESS TEST TRACING ONLY: CPT

## 2019-11-18 PROCEDURE — 82024 ASSAY OF ACTH: CPT | Performed by: INTERNAL MEDICINE

## 2019-11-18 PROCEDURE — 93018 CV STRESS TEST I&R ONLY: CPT | Performed by: INTERNAL MEDICINE

## 2019-11-18 PROCEDURE — G0378 HOSPITAL OBSERVATION PER HR: HCPCS

## 2019-11-18 PROCEDURE — 80048 BASIC METABOLIC PNL TOTAL CA: CPT | Performed by: HOSPITALIST

## 2019-11-18 PROCEDURE — 85007 BL SMEAR W/DIFF WBC COUNT: CPT | Performed by: HOSPITALIST

## 2019-11-18 PROCEDURE — 93306 TTE W/DOPPLER COMPLETE: CPT | Performed by: INTERNAL MEDICINE

## 2019-11-18 PROCEDURE — 82533 TOTAL CORTISOL: CPT | Performed by: HOSPITALIST

## 2019-11-18 PROCEDURE — 0 TECHNETIUM SESTAMIBI: Performed by: HOSPITALIST

## 2019-11-18 PROCEDURE — 93306 TTE W/DOPPLER COMPLETE: CPT

## 2019-11-18 PROCEDURE — 92610 EVALUATE SWALLOWING FUNCTION: CPT

## 2019-11-18 PROCEDURE — 80061 LIPID PANEL: CPT | Performed by: NURSE PRACTITIONER

## 2019-11-18 PROCEDURE — 78452 HT MUSCLE IMAGE SPECT MULT: CPT | Performed by: INTERNAL MEDICINE

## 2019-11-18 PROCEDURE — A9500 TC99M SESTAMIBI: HCPCS | Performed by: HOSPITALIST

## 2019-11-18 PROCEDURE — 96361 HYDRATE IV INFUSION ADD-ON: CPT

## 2019-11-18 PROCEDURE — 99214 OFFICE O/P EST MOD 30 MIN: CPT | Performed by: NURSE PRACTITIONER

## 2019-11-18 PROCEDURE — 84439 ASSAY OF FREE THYROXINE: CPT | Performed by: INTERNAL MEDICINE

## 2019-11-18 PROCEDURE — 25010000002 REGADENOSON 0.4 MG/5ML SOLUTION: Performed by: HOSPITALIST

## 2019-11-18 RX ORDER — ATORVASTATIN CALCIUM 40 MG/1
40 TABLET, FILM COATED ORAL DAILY
Qty: 30 TABLET | Refills: 0 | Status: SHIPPED | OUTPATIENT
Start: 2019-11-18 | End: 2019-12-12 | Stop reason: ALTCHOICE

## 2019-11-18 RX ORDER — PREDNISONE 2.5 MG
2.5 TABLET ORAL
Status: DISCONTINUED | OUTPATIENT
Start: 2019-11-18 | End: 2019-11-18 | Stop reason: HOSPADM

## 2019-11-18 RX ORDER — PREDNISONE 2.5 MG
2.5 TABLET ORAL 2 TIMES DAILY WITH MEALS
Status: DISCONTINUED | OUTPATIENT
Start: 2019-11-18 | End: 2019-11-18

## 2019-11-18 RX ORDER — PREDNISONE 2.5 MG
2.5 TABLET ORAL
Qty: 30 TABLET | Refills: 0 | Status: SHIPPED | OUTPATIENT
Start: 2019-11-18 | End: 2019-12-18

## 2019-11-18 RX ORDER — PREDNISONE 1 MG/1
5 TABLET ORAL
Qty: 30 TABLET | Refills: 0 | Status: SHIPPED | OUTPATIENT
Start: 2019-11-19 | End: 2019-12-19

## 2019-11-18 RX ORDER — PREDNISONE 1 MG/1
5 TABLET ORAL
Status: DISCONTINUED | OUTPATIENT
Start: 2019-11-19 | End: 2019-11-18 | Stop reason: HOSPADM

## 2019-11-18 RX ADMIN — PANTOPRAZOLE SODIUM 40 MG: 40 TABLET, DELAYED RELEASE ORAL at 06:22

## 2019-11-18 RX ADMIN — TECHNETIUM TC 99M SESTAMIBI 1 DOSE: 1 INJECTION INTRAVENOUS at 13:45

## 2019-11-18 RX ADMIN — PREDNISONE 2.5 MG: 2.5 TABLET ORAL at 15:08

## 2019-11-18 RX ADMIN — Medication 2 WAFER: at 15:07

## 2019-11-18 RX ADMIN — LEVOTHYROXINE SODIUM 75 MCG: 75 TABLET ORAL at 06:22

## 2019-11-18 RX ADMIN — PREDNISONE 2.5 MG: 2.5 TABLET ORAL at 17:42

## 2019-11-18 RX ADMIN — TECHNETIUM TC 99M SESTAMIBI 1 DOSE: 1 INJECTION INTRAVENOUS at 11:15

## 2019-11-18 RX ADMIN — REGADENOSON 0.4 MG: 0.08 INJECTION, SOLUTION INTRAVENOUS at 13:45

## 2019-11-18 RX ADMIN — CETIRIZINE HYDROCHLORIDE 5 MG: 10 TABLET, FILM COATED ORAL at 15:07

## 2019-11-18 RX ADMIN — SODIUM CHLORIDE 75 ML/HR: 9 INJECTION, SOLUTION INTRAVENOUS at 09:07

## 2019-11-18 RX ADMIN — ESCITALOPRAM 20 MG: 20 TABLET, FILM COATED ORAL at 15:07

## 2019-11-18 RX ADMIN — Medication 1 CAPSULE: at 15:07

## 2019-11-18 NOTE — DISCHARGE SUMMARY
PHYSICIAN DISCHARGE SUMMARY                                                                        [facility]    Patient Identification:  Name: Mariana Gilbert  Age: 69 y.o.  Sex: female  :  1950  MRN: [id number]  Primary Care Physician: Racheal Adams APRN    Admit date: 2019  Discharge date and time:2019  Discharged Condition: good    Discharge Diagnoses:  Active Hospital Problems    Diagnosis  POA   • **Syncope and collapse [R55]  Yes   • Adrenal insufficiency ([place] Willow's disease) (CMS/[facility]) [E27.1]  Unknown   • Hypothyroidism [E03.9]  Yes   • Hyperlipidemia [E78.5]  Yes      Resolved Hospital Problems   No resolved problems to display.          PMHX:   Past Medical History:   Diagnosis Date   • Arthritis    • Depression    • Exertional dyspnea    • Fatigue    • GERD (gastroesophageal reflux disease)    • Hyperlipidemia    • Hypothyroidism    • IBS (irritable bowel syndrome)    • Seasonal allergies      PSHX:   Past Surgical History:   Procedure Laterality Date   • BREAST BIOPSY Bilateral     benign pathology   • COLONOSCOPY N/A 2018    done at [facility]   • PARTIAL HYSTERECTOMY Bilateral     Bilateral ovaries in tact-Dr. Ulrich   • PITUITARY EXCISION      [facility]       Hospital Course: Mariana Gilbert  is a 69 y.o. former smoker with a history of GERD, HLD and hypothyroidism that presents to [facility] complaining of a syncopal episode.  She states she has not felt very well since getting a flu shot about 3 days ago.  She had a small loose stool this morning but no abdominal pain.  Felt okay enough to go shopping.  She was walking with family members when she started feeling abnormal.  She became a little lightheaded and nauseated and decided to sit down.  She then passed out.  This lasted for about 20 seconds but there was an episode of fecal incontinence.   "When she woke up she was very nauseated and had emesis.  It was reported both legs were \"twitching.\"  But no generalized seizure activity.  Patient reports a similar episode a few months ago but there is no incontinence at that time.  She did not seek medical attention at that time.  She denies any recent change to her medications.  There is no associated chest pain or palpitations.  She has previously seen Dr. Velázquez.  She had a negative stress test in January 2016.  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  She takes prednisone and Synthroid and has not missed any dosages.         The patient was admitted to the hospital and seen by cardiology and endocrinology.  The patient had nuclear stress test which was negative for ischemia and normal ejection fraction of 70%.  Echocardiogram was done and is pending at discharge.  Endocrine also saw the patient and recommended she take higher dose of prednisone for her adrenal insufficiency.  Her prednisone was increased to 5 mg in the morning and 2.5 mg in the afternoon.  Her cholesterol was also high and cardiology recommended she start a statin and will put her on Lipitor 40 mg daily.  She will follow-up with her primary care provider in 1 week for continuing care.  She will also follow-up with her endocrinologist.    Consults:     Consults     Date and Time Order Name Status Description    11/17/2019 1400 Inpatient Endocrinology Consult Completed     11/16/2019 2049 Inpatient Cardiology Consult      11/16/2019 6901 [facility] (on-call MD unless specified) Details Completed         Results from last 7 days   Lab Units 11/18/19  0503   WBC 10*3/mm3 7.05   HEMOGLOBIN g/dL 12.9   HEMATOCRIT % 40.6   PLATELETS 10*3/mm3 267     Results from last 7 days   Lab Units 11/18/19  0503   SODIUM mmol/L 146*   POTASSIUM mmol/L 4.0   CHLORIDE mmol/L 113*   CO2 mmol/L 20.6*   BUN mg/dL 5*   CREATININE mg/dL 0.64   GLUCOSE " mg/dL 90   CALCIUM mg/dL 8.7     Significant Diagnostic Studies:   Lab Results   Component Value Date    WBC 7.05 11/18/2019    HGB 12.9 11/18/2019    HCT 40.6 11/18/2019     11/18/2019     Lab Results   Component Value Date     (H) 11/18/2019    K 4.0 11/18/2019     (H) 11/18/2019    CO2 20.6 (L) 11/18/2019    BUN 5 (L) 11/18/2019    CREATININE 0.64 11/18/2019    GLUCOSE 90 11/18/2019     Lab Results   Component Value Date    CALCIUM 8.7 11/18/2019    MG 2.2 11/16/2019     Lab Results   Component Value Date    AST 30 11/16/2019    ALT 27 11/16/2019    ALKPHOS 93 11/16/2019     No results found for: APTT, INR  Lab Results   Component Value Date    COLORU Yellow 11/16/2019    CLARITYU Cloudy (A) 11/16/2019    PHUR <=5.0 11/16/2019    GLUCOSEU Negative 11/16/2019    KETONESU Trace (A) 11/16/2019    BLOODU Negative 11/16/2019    LEUKOCYTESUR Trace (A) 11/16/2019    BILIRUBINUR Negative 11/16/2019    UROBILINOGEN 0.2 E.U./dL 11/16/2019    RBCUA None Seen 11/16/2019    WBCUA 0-2 11/16/2019    BACTERIA Trace (A) 11/16/2019     Lab Results   Component Value Date    TROPONINT <0.010 11/16/2019     No components found for: HGBA1C;2  No components found for: TSH;2  Imaging Results (All)     Procedure Component Value Units Date/Time    XR Chest 1 View [[id number]] Collected:  11/16/19 1338     Updated:  11/16/19 1543    Narrative:       ONE VIEW PORTABLE CHEST     HISTORY: Cough. Nausea.     FINDINGS: The lungs are well-expanded and clear and the heart size is  normal. No acute abnormality is seen. There may be a small hiatus  hernia.     This report was finalized on 11/16/2019 3:40 PM by Dr. Javad Casanova M.D.       CT Head Without Contrast [[id number]] Collected:  11/16/19 1415     Updated:  11/16/19 1543    Narrative:       CT SCAN OF THE BRAIN WITHOUT CONTRAST     HISTORY: Syncopal episode. Incontinence.     TECHNIQUE/FINDINGS: The CT scan was performed as an emergency procedure  through the brain  without contrast. There is mild diffuse atrophy and  chronic small vessel ischemic change. There is no evidence of  intracranial hemorrhage or mass effect and the visualized sinuses are  clear.                 Radiation dose reduction techniques were utilized, including automated  exposure control and exposure modulation based on body size.     This report was finalized on 11/16/2019 3:40 PM by Dr. Javad Casanova M.D.           Lab Results (last 7 days)     Procedure Component Value Units Date/Time    Lipid Panel [[id number]]  (Abnormal) Collected:  11/18/19 0503    Specimen:  Blood Updated:  11/18/19 0936     Total Cholesterol 280 mg/dL      Triglycerides 323 mg/dL      HDL Cholesterol 31 mg/dL      LDL Cholesterol  184 mg/dL      VLDL Cholesterol 64.6 mg/dL      LDL/HDL Ratio 5.95    Narrative:       Cholesterol Reference Ranges  (U.S. Department of Health and Human Services ATP III Classifications)    Desirable          <200 mg/dL  Borderline High    200-239 mg/dL  High Risk          >240 mg/dL      Triglyceride Reference Ranges  (U.S. Department of Health and Human Services ATP III Classifications)    Normal           <150 mg/dL  Borderline High  150-199 mg/dL  High             200-499 mg/dL  Very High        >500 mg/dL    HDL Reference Ranges  (U.S. Department of Health and Human Services ATP III Classifcations)    Low     <40 mg/dl (major risk factor for CHD)  High    >60 mg/dl ('negative' risk factor for CHD)        LDL Reference Ranges  (U.S. Department of Health and Human Services ATP III Classifcations)    Optimal          <100 mg/dL  Near Optimal     100-129 mg/dL  Borderline High  130-159 mg/dL  High             160-189 mg/dL  Very High        >189 mg/dL    TSH [[id number]]  (Abnormal) Collected:  11/18/19 0503    Specimen:  Blood Updated:  11/18/19 0827     TSH <0.005 uIU/mL     T4, Free [[id number]]  (Normal) Collected:  11/18/19 0503    Specimen:  Blood Updated:  11/18/19 0811     Free T4 1.40 ng/dL      Cortisol [[id number]] Collected:  11/18/19 0503    Specimen:  Blood Updated:  11/18/19 0746     Cortisol <0.11 mcg/dL     Narrative:       Cortisol Reference Ranges:    Cortisol 6AM - 10AM Range: 6.02-18.40 mcg/dl  Cortisol 4PM - 8PM Range: 2.68-10.50 mcg/dl      CBC & Differential [[id number]] Collected:  11/18/19 0503    Specimen:  Blood Updated:  11/18/19 0654    Narrative:       The following orders were created for panel order CBC & Differential.  Procedure                               Abnormality         Status                     ---------                               -----------         ------                     CBC Auto Differential[[id number]]        Normal              Final result                 Please view results for these tests on the individual orders.    CBC Auto Differential [[id number]]  (Normal) Collected:  11/18/19 0503    Specimen:  Blood Updated:  11/18/19 0654     WBC 7.05 10*3/mm3      RBC 4.48 10*6/mm3      Hemoglobin 12.9 g/dL      Hematocrit 40.6 %      MCV 90.6 fL      MCH 28.8 pg      MCHC 31.8 g/dL      RDW 12.6 %      RDW-SD 41.4 fl      MPV 10.0 fL      Platelets 267 10*3/mm3     Manual Differential [[id number]]  (Abnormal) Collected:  11/18/19 0503    Specimen:  Blood Updated:  11/18/19 0654     Neutrophil % 45.5 %      Lymphocyte % 47.5 %      Monocyte % 2.0 %      Eosinophil % 4.0 %      Basophil % 1.0 %      Neutrophils Absolute 3.21 10*3/mm3      Lymphocytes Absolute 3.35 10*3/mm3      Monocytes Absolute 0.14 10*3/mm3      Eosinophils Absolute 0.28 10*3/mm3      Basophils Absolute 0.07 10*3/mm3      RBC Morphology Normal     Smudge Cells Slight/1+     Platelet Morphology Normal    Basic Metabolic Panel [[id number]]  (Abnormal) Collected:  11/18/19 0503    Specimen:  Blood Updated:  11/18/19 0620     Glucose 90 mg/dL      BUN 5 mg/dL      Creatinine 0.64 mg/dL      Sodium 146 mmol/L      Potassium 4.0 mmol/L      Chloride 113 mmol/L      CO2 20.6 mmol/L      Calcium 8.7 mg/dL       eGFR Non African Amer 92 mL/min/1.73      BUN/Creatinine Ratio 7.8     Anion Gap 12.4 mmol/L     Narrative:       GFR Normal >60  Chronic Kidney Disease <60  Kidney Failure <15    ACTH [[id number]] Collected:  11/18/19 0503    Specimen:  Blood Updated:  11/18/19 0537    Cortisol [[id number]] Collected:  11/17/19 1554    Specimen:  Blood from Arm, Right Updated:  11/17/19 1716     Cortisol 0.41 mcg/dL     Narrative:       Cortisol Reference Ranges:    Cortisol 6AM - 10AM Range: 6.02-18.40 mcg/dl  Cortisol 4PM - 8PM Range: 2.68-10.50 mcg/dl      Basic Metabolic Panel [[id number]]  (Abnormal) Collected:  11/17/19 0534    Specimen:  Blood Updated:  11/17/19 0651     Glucose 82 mg/dL      BUN 8 mg/dL      Creatinine 0.73 mg/dL      Sodium 142 mmol/L      Potassium 3.9 mmol/L      Chloride 112 mmol/L      CO2 18.4 mmol/L      Calcium 8.2 mg/dL      eGFR Non African Amer 79 mL/min/1.73      BUN/Creatinine Ratio 11.0     Anion Gap 11.6 mmol/L     Narrative:       GFR Normal >60  Chronic Kidney Disease <60  Kidney Failure <15    CBC (No Diff) [[id number]]  (Normal) Collected:  11/17/19 0534    Specimen:  Blood Updated:  11/17/19 0615     WBC 6.45 10*3/mm3      RBC 4.04 10*6/mm3      Hemoglobin 12.3 g/dL      Hematocrit 36.5 %      MCV 90.3 fL      MCH 30.4 pg      MCHC 33.7 g/dL      RDW 12.4 %      RDW-SD 40.8 fl      MPV 9.8 fL      Platelets 236 10*3/mm3     Urinalysis, Microscopic Only - Urine, Catheter [[id number]]  (Abnormal) Collected:  11/16/19 1359    Specimen:  Urine, Catheter Updated:  11/16/19 1450     RBC, UA None Seen /HPF      WBC, UA 0-2 /HPF      Bacteria, UA Trace /HPF      Squamous Epithelial Cells, UA 0-2 /HPF      Hyaline Casts, UA Too Numerous to Count /LPF      Methodology Manual Light Microscopy    Urinalysis With Microscopic If Indicated (No Culture) - Urine, Catheter [[id number]]  (Abnormal) Collected:  11/16/19 1359    Specimen:  Urine, Catheter Updated:  11/16/19 1434     Color, UA Yellow      Appearance, UA Cloudy     pH, UA <=5.0     Specific Gravity, UA 1.019     Glucose, UA Negative     Ketones, UA Trace     Bilirubin, UA Negative     Blood, UA Negative     Protein, UA Trace     Leuk Esterase, UA Trace     Nitrite, UA Negative     Urobilinogen, UA 0.2 E.U./dL    [place] Draw [[id number]] Collected:  11/16/19 1316    Specimen:  Blood Updated:  11/16/19 1431    Narrative:       The following orders were created for panel order [place] Draw.  Procedure                               Abnormality         Status                     ---------                               -----------         ------                     Light Blue Top[[id number]]                                   Final result               Green Top (Gel)[[id number]]                                  Final result               Lavender Top[[id number]]                                     Final result               Gold Top - SST[[id number]]                                   Final result                 Please view results for these tests on the individual orders.    Light Blue Top [[id number]] Collected:  11/16/19 1316    Specimen:  Blood Updated:  11/16/19 1431     Extra Tube hold for add-on     Comment: Auto resulted       Green Top (Gel) [[id number]] Collected:  11/16/19 1316    Specimen:  Blood Updated:  11/16/19 1431     Extra Tube Hold for add-ons.     Comment: Auto resulted.       Lavender Top [[id number]] Collected:  11/16/19 1316    Specimen:  Blood Updated:  11/16/19 1431     Extra Tube hold for add-on     Comment: Auto resulted       Gold Top - SST [[id number]] Collected:  11/16/19 1316    Specimen:  Blood Updated:  11/16/19 1431     Extra Tube Hold for add-ons.     Comment: Auto resulted.       Comprehensive Metabolic Panel [[id number]]  (Abnormal) Collected:  11/16/19 1316    Specimen:  Blood Updated:  11/16/19 1357     Glucose 89 mg/dL      BUN 11 mg/dL      Creatinine 1.08 mg/dL      Sodium 133 mmol/L      Potassium 3.6 mmol/L      Chloride  97 mmol/L      CO2 22.2 mmol/L      Calcium 9.7 mg/dL      Total Protein 7.2 g/dL      Albumin 4.40 g/dL      ALT (SGPT) 27 U/L      AST (SGOT) 30 U/L      Alkaline Phosphatase 93 U/L      Total Bilirubin 0.8 mg/dL      eGFR Non African Amer 50 mL/min/1.73      Globulin 2.8 gm/dL      A/G Ratio 1.6 g/dL      BUN/Creatinine Ratio 10.2     Anion Gap 13.8 mmol/L     Narrative:       GFR Normal >60  Chronic Kidney Disease <60  Kidney Failure <15    Troponin [[id number]]  (Normal) Collected:  11/16/19 1316    Specimen:  Blood Updated:  11/16/19 1356     Troponin T <0.010 ng/mL     Narrative:       Troponin T Reference Range:  <= 0.03 ng/mL-   Negative for AMI  >0.03 ng/mL-     Abnormal for myocardial necrosis.  Clinicians would have to utilize clinical acumen, EKG, Troponin and serial changes to determine if it is an Acute Myocardial Infarction or myocardial injury due to an underlying chronic condition.     Magnesium [[id number]]  (Normal) Collected:  11/16/19 1316    Specimen:  Blood Updated:  11/16/19 1356     Magnesium 2.2 mg/dL     CBC & Differential [[id number]] Collected:  11/16/19 1316    Specimen:  Blood Updated:  11/16/19 1337    Narrative:       The following orders were created for panel order CBC & Differential.  Procedure                               Abnormality         Status                     ---------                               -----------         ------                     CBC Auto Differential[[id number]]        Abnormal            Final result                 Please view results for these tests on the individual orders.    CBC Auto Differential [[id number]]  (Abnormal) Collected:  11/16/19 1316    Specimen:  Blood Updated:  11/16/19 1337     WBC 8.57 10*3/mm3      RBC 4.90 10*6/mm3      Hemoglobin 14.6 g/dL      Hematocrit 44.3 %      MCV 90.4 fL      MCH 29.8 pg      MCHC 33.0 g/dL      RDW 12.6 %      RDW-SD 41.7 fl      MPV 9.7 fL      Platelets 261 10*3/mm3      Neutrophil % 35.9 %       "Lymphocyte % 48.1 %      Monocyte % 12.1 %      Eosinophil % 2.9 %      Basophil % 0.8 %      Immature Grans % 0.2 %      Neutrophils, Absolute 3.07 10*3/mm3      Lymphocytes, Absolute 4.12 10*3/mm3      Monocytes, Absolute 1.04 10*3/mm3      Eosinophils, Absolute 0.25 10*3/mm3      Basophils, Absolute 0.07 10*3/mm3      Immature Grans, Absolute 0.02 10*3/mm3      nRBC 0.0 /100 WBC     POC Occult Blood Stool [[id number]]  (Normal) Collected:  11/16/19 1232    Specimen:  Stool from Per Rectum Updated:  11/16/19 1233     Fecal Occult Blood Negative     Lot Number [id number]     Expiration Date 05/31/2020     Positive Control Positive     Negative Control Negative    POC Glucose Once [[id number]]  (Normal) Collected:  11/16/19 1227    Specimen:  Blood Updated:  11/16/19 1229     Glucose 91 mg/dL         /93   Pulse 107   Temp 98.2 °F (36.8 °C) (Oral)   Resp 18   Ht 160 cm (62.99\")   Wt 66 kg (145 lb 8.1 oz)   SpO2 92%   BMI 25.78 kg/m²     Discharge Exam:  General Appearance:    Alert, cooperative, no distress                          Head:    Normocephalic, without obvious abnormality, atraumatic                          Eyes:                            Throat:   Lips, tongue, gums normal                          Neck:   Supple, symmetrical, trachea midline, no JVD                        Lungs:     Clear to auscultation bilaterally, respirations unlabored                Chest Wall:    No tenderness or deformity                        Heart:    Regular rate and rhythm, S1 and S2 normal, no murmur,no  Rub or gallop                  Abdomen:     Soft, non-tender, bowel sounds active, no masses, no organomegaly                  Extremities:   Extremities normal, atraumatic, no cyanosis or edema                             Skin:   Skin is warm and dry,  no rashes or palpable lesions                  Neurologic:   no focal deficits noted     Disposition:  Home    Patient Instructions:      Discharge Medications      New " Medications      Instructions Start Date   atorvastatin 40 MG tablet  Commonly known as:  LIPITOR   40 mg, Oral, Daily         Changes to Medications      Instructions Start Date   predniSONE 2.5 MG tablet  Commonly known as:  DELTASONE  What changed:  You were already taking a medication with the same name, and this prescription was added. Make sure you understand how and when to take each.   2.5 mg, Oral, Daily Before Supper      predniSONE 5 MG tablet  Commonly known as:  DELTASONE  What changed:    · medication strength  · how much to take  · when to take this   5 mg, Oral, Daily With Breakfast   Start Date:  11/19/2019        Continue These Medications      Instructions Start Date   cetirizine 10 MG tablet  Commonly known as:  zyrTEC   10 mg, Oral, Daily      difluprednate 0.05 % ophthalmic emulsion  Commonly known as:  DUREZOL   1 drop, Right Eye, Daily      escitalopram 20 MG tablet  Commonly known as:  LEXAPRO   20 mg, Oral, Daily      levothyroxine 75 MCG tablet  Commonly known as:  SYNTHROID, LEVOTHROID   75 mcg, Oral, Daily      pantoprazole 40 MG EC tablet  Commonly known as:  PROTONIX   40 mg, Oral, Daily      pramipexole 0.125 MG tablet  Commonly known as:  MIRAPEX   0.125 mg, Oral, Daily      PROBIOTIC PO   1 capsule, Oral, Daily, Culturelle OTC      psyllium 58.6 % packet  Commonly known as:  METAMUCIL   1 packet, Oral, Daily      timolol 0.25 % ophthalmic solution  Commonly known as:  TIMOPTIC   1 drop, Left Eye, Daily         Stop These Medications    fexofenadine 180 MG tablet  Commonly known as:  ALLEGRA          No future appointments.  Follow-up Information     Racheal Adams, APRN Follow up in 1 week(s).    Specialty:  Family Medicine  Contact information:  [address]  [facility] [address] [phone]             Edward Sterling MD .    Specialty:  Family Medicine  Contact information:  [address] [facility] [address] [phone]                 Discharge Order (From  admission, onward)    Start     Ordered    11/18/19 1637  Discharge patient  Once     Expected Discharge Date:  11/18/19    Discharge Disposition:  Home or Self Care    Physician of Record for Attribution - Please select from Treatment Team:  ZAFAR HAINES [[id number]]    Review needed by CMO to determine Physician of Record:  No       Question Answer Comment   Physician of Record for Attribution - Please select from Treatment Team ZAFAR HAINES    Review needed by CMO to determine Physician of Record No        11/18/19 1639          Total time spent discharging patient including evaluation,post hospitalization follow up,  medication and post hospitalization instructions and education total time exceeds 30 minutes.    Signed:  Zafar Haines MD  11/18/2019  4:40 PM

## 2019-11-18 NOTE — PROGRESS NOTES
LOS: 0 days   Patient Care Team:  Racheal Adams APRN as PCP - General (Family Medicine)  Edward Sterling MD as PCP - Family Medicine      Chief Complaint: Follow-up syncope       Interval History: She is resting in the bed with no complaints.  She denies chest pain or shortness of breath.       Objective   Vital Signs  Temp:  [97.8 °F (36.6 °C)-98.2 °F (36.8 °C)] 98.2 °F (36.8 °C)  Heart Rate:  [] 107  Resp:  [18] 18  BP: (130-170)/() 144/93    Intake/Output Summary (Last 24 hours) at 11/18/2019 1038  Last data filed at 11/18/2019 0907  Gross per 24 hour   Intake 4719 ml   Output --   Net 4719 ml           Physical Exam   Constitutional: She is oriented to person, place, and time. She appears well-developed and well-nourished. No distress.   HENT:   Head: Normocephalic and atraumatic.   Eyes: Pupils are equal, round, and reactive to light.   Neck: No JVD present. No thyromegaly present.   Cardiovascular: Normal rate, regular rhythm, normal heart sounds and intact distal pulses.   No murmur heard.  Pulmonary/Chest: Effort normal and breath sounds normal. No respiratory distress.   Abdominal: Soft. Bowel sounds are normal. She exhibits no distension. There is no splenomegaly or hepatomegaly. There is no tenderness.   Musculoskeletal: Normal range of motion. She exhibits no edema.   Neurological: She is alert and oriented to person, place, and time.   Skin: Skin is warm and dry. She is not diaphoretic. No erythema.   Psychiatric: She has a normal mood and affect. Her behavior is normal. Judgment normal.       Results Review:      Results from last 7 days   Lab Units 11/18/19  0503 11/17/19  0534 11/16/19  1316   SODIUM mmol/L 146* 142 133*   POTASSIUM mmol/L 4.0 3.9 3.6   CHLORIDE mmol/L 113* 112* 97*   CO2 mmol/L 20.6* 18.4* 22.2   BUN mg/dL 5* 8 11   CREATININE mg/dL 0.64 0.73 1.08*   GLUCOSE mg/dL 90 82 89   CALCIUM mg/dL 8.7 8.2* 9.7     Results from last 7 days   Lab Units 11/16/19  6947    TROPONIN T ng/mL <0.010     Results from last 7 days   Lab Units 11/18/19  0503 11/17/19  0534 11/16/19  1316   WBC 10*3/mm3 7.05 6.45 8.57   HEMOGLOBIN g/dL 12.9 12.3 14.6   HEMATOCRIT % 40.6 36.5 44.3   PLATELETS 10*3/mm3 267 236 261         Results from last 7 days   Lab Units 11/18/19  0503   CHOLESTEROL mg/dL 280*     Results from last 7 days   Lab Units 11/16/19  1316   MAGNESIUM mg/dL 2.2     Results from last 7 days   Lab Units 11/18/19  0503   CHOLESTEROL mg/dL 280*   TRIGLYCERIDES mg/dL 323*   HDL CHOL mg/dL 31*   LDL CHOL mg/dL 184*       I reviewed the patient's new clinical results.  I personally viewed and interpreted the patient's EKG/Telemetry data        Medication Review:     cetirizine 5 mg Oral Daily   enoxaparin 40 mg Subcutaneous Q24H   escitalopram 20 mg Oral Daily   lactobacillus acidophilus 1 capsule Oral Daily   levothyroxine 75 mcg Oral Q AM   melatonin 3 mg Oral Nightly   METAMUCIL 2 wafer Oral Daily   pantoprazole 40 mg Oral Q AM   pramipexole 0.5 mg Oral Nightly   predniSONE 2.5 mg Oral Daily   [COMPLETED] technetium sestamibi 1 dose Intravenous Once in imaging         sodium chloride 75 mL/hr Last Rate: 75 mL/hr (11/18/19 0907)       Assessment/Plan       Syncope and collapse    Hypothyroidism    Hyperlipidemia      1.  Syncope.  Associated with nausea, vomiting, and diarrhea.  Suspected vasovagal tendency.  Recent flu shot and underlying adrenal insufficiency. Orthostatics positive.  Echocardiogram pending.  2.  Exertional dyspnea.  She denies shortness of breath since admission but reports a several month history of shortness of breath and fatigue on exertion.  She has a strong family history of ischemic cardiomyopathy.  Echocardiogram pending.  Will proceed with nuclear stress test today.   3.  Adrenal insufficiency.  Per endocrinology.  4.  Hyperlipidemia. Check a lipid panel.  5.  Hypothyroidism.  On levothyroxine.      Kitty Worrell, APRROBERT  11/18/19  10:38 AM

## 2019-11-18 NOTE — CONSULTS
69 y.o.  Patient Care Team:  Racheal Adams APRN as PCP - General (Family Medicine)  Edward Sterling MD as PCP - Family Medicine      Chief Complaint   Patient presents with   • Syncope     Reason for consultation-concern for adrenal insufficiency    HPI  69-year-old white female with past medical history as mentioned below presents to the hospital with syncopal episode.  Cardiology has been consulted.  Endocrinology has been consulted for the concern of adrenal insufficiency.  Patient sees Dr. Collins Gerard as her endocrinologist as an outpatient.    Patient reports that in 1990 she has been diagnosed with a pituitary tumor status post surgery and since then she has been on steroid replacement and thyroid replacement.  At home she takes prednisone 2.5 mg oral daily, levothyroxine 50 mcg oral daily.  She has been compliant with these medications.  She even knows to double up on her prednisone when she is sick.  She did have a similar episode of dizziness associated with some nausea and vomiting during summer at which time she doubled up on her steroid replacement.    Past Medical History:   Diagnosis Date   • Arthritis    • Depression    • Exertional dyspnea    • Fatigue    • GERD (gastroesophageal reflux disease)    • Hyperlipidemia    • Hypothyroidism    • IBS (irritable bowel syndrome)    • Seasonal allergies        Family History   Problem Relation Age of Onset   • Heart disease Mother    • Aortic aneurysm Sister    • Heart disease Sister    • Breast cancer Sister    • Aortic aneurysm Brother    • Heart disease Brother    • Stroke Brother         CVA   • Hypertension Other        Social History     Socioeconomic History   • Marital status:      Spouse name: Not on file   • Number of children: Not on file   • Years of education: Not on file   • Highest education level: Not on file   Tobacco Use   • Smoking status: Former Smoker     Packs/day: 1.00     Years: 30.00     Pack years: 30.00     Types:  Cigarettes     Last attempt to quit: 2000     Years since quittin.8   • Smokeless tobacco: Never Used   Substance and Sexual Activity   • Alcohol use: Yes     Comment: social, 1-2 drinks occassionally   • Drug use: No   • Sexual activity: Defer       Allergies   Allergen Reactions   • Sulfa Antibiotics Rash         Current Facility-Administered Medications:   •  acetaminophen (TYLENOL) tablet 650 mg, 650 mg, Oral, Q4H PRN **OR** acetaminophen (TYLENOL) 160 MG/5ML solution 650 mg, 650 mg, Oral, Q4H PRN **OR** acetaminophen (TYLENOL) suppository 650 mg, 650 mg, Rectal, Q4H PRN, Vel Cronin MD  •  cetirizine (zyrTEC) tablet 5 mg, 5 mg, Oral, Daily, Vel Cronin MD, 5 mg at 19  •  enoxaparin (LOVENOX) syringe 40 mg, 40 mg, Subcutaneous, Q24H, Vel Cronin MD, 40 mg at 19  •  escitalopram (LEXAPRO) tablet 20 mg, 20 mg, Oral, Daily, Vel Cronin MD, 20 mg at 19  •  lactobacillus acidophilus (RISAQUAD) capsule 1 capsule, 1 capsule, Oral, Daily, Vel Cronin MD, 1 capsule at 19  •  levothyroxine (SYNTHROID, LEVOTHROID) tablet 75 mcg, 75 mcg, Oral, Q AM, Vel Cronin MD, 75 mcg at 19  •  melatonin tablet 3 mg, 3 mg, Oral, Nightly, Zafar Haines MD, 3 mg at 19  •  METAMUCIL wafer 2 wafer, 2 wafer, Oral, Daily, Vel Cronin MD, 2 wafer at 19  •  nitroglycerin (NITROSTAT) SL tablet 0.4 mg, 0.4 mg, Sublingual, Q5 Min PRN, Vel Cronin MD  •  ondansetron (ZOFRAN) tablet 4 mg, 4 mg, Oral, Q6H PRN **OR** ondansetron (ZOFRAN) injection 4 mg, 4 mg, Intravenous, Q6H PRN, Vel Cronin MD  •  pantoprazole (PROTONIX) EC tablet 40 mg, 40 mg, Oral, Q AM, Vel Cronin MD, 40 mg at 19 06  •  pramipexole (MIRAPEX) tablet 0.5 mg, 0.5 mg, Oral, Nightly, Zafar Haines MD, 0.5 mg at 19  •  predniSONE (DELTASONE) tablet 2.5 mg, 2.5 mg, Oral, BID With Meals, Daniel Smith MD  •  sodium chloride 0.9 % infusion, 75 mL/hr,  Intravenous, Continuous, Zafar Haines MD, Last Rate: 75 mL/hr at 11/18/19 0907, 75 mL/hr at 11/18/19 0907         Review of Systems   Constitutional: Positive for appetite change and fatigue. Negative for fever.   Eyes: Negative for visual disturbance.   Respiratory: Negative for shortness of breath.    Cardiovascular: Negative for palpitations and leg swelling.   Gastrointestinal: Negative for abdominal pain and vomiting.   Endocrine: Negative for polydipsia and polyuria.   Musculoskeletal: Negative for joint swelling and neck pain.   Skin: Negative for rash.   Neurological: Positive for weakness and numbness.   Psychiatric/Behavioral: Negative for behavioral problems.     Objective     Vital Signs  Temp:  [97.8 °F (36.6 °C)-98.2 °F (36.8 °C)] 98.2 °F (36.8 °C)  Heart Rate:  [] 107  Resp:  [18] 18  BP: (130-170)/() 144/93    Physical Exam  Physical Exam   Constitutional: She is oriented to person, place, and time. She appears well-nourished.   HENT:   Head: Normocephalic and atraumatic.   Eyes: Conjunctivae and EOM are normal. No scleral icterus.   Neck: Normal range of motion. Neck supple. No thyromegaly present.   Cardiovascular: Normal rate and normal heart sounds. Exam reveals no friction rub.   No murmur heard.  Pulmonary/Chest: Effort normal and breath sounds normal. No stridor. She has no wheezes. She has no rales.   Abdominal: Soft. Bowel sounds are normal. She exhibits no distension. There is no tenderness.   Musculoskeletal: She exhibits edema. She exhibits no tenderness.   Lymphadenopathy:     She has no cervical adenopathy.   Neurological: She is alert and oriented to person, place, and time.   Skin: Skin is warm and dry. She is not diaphoretic.   Psychiatric: She has a normal mood and affect.   Vitals reviewed.      Results Review:    I reviewed the patient's new clinical results and summarized them in the HPI and in plan.  Glucose   Date/Time Value Ref Range Status   11/16/2019 1227 91  70 - 130 mg/dL Final     Lab Results (last 24 hours)     Procedure Component Value Units Date/Time    Lipid Panel [[id number]]  (Abnormal) Collected:  11/18/19 0503    Specimen:  Blood Updated:  11/18/19 0936     Total Cholesterol 280 mg/dL      Triglycerides 323 mg/dL      HDL Cholesterol 31 mg/dL      LDL Cholesterol  184 mg/dL      VLDL Cholesterol 64.6 mg/dL      LDL/HDL Ratio 5.95    Narrative:       Cholesterol Reference Ranges  (U.S. Department of Health and Human Services ATP III Classifications)    Desirable          <200 mg/dL  Borderline High    200-239 mg/dL  High Risk          >240 mg/dL      Triglyceride Reference Ranges  (U.S. Department of Health and Human Services ATP III Classifications)    Normal           <150 mg/dL  Borderline High  150-199 mg/dL  High             200-499 mg/dL  Very High        >500 mg/dL    HDL Reference Ranges  (U.S. Department of Health and Human Services ATP III Classifcations)    Low     <40 mg/dl (major risk factor for CHD)  High    >60 mg/dl ('negative' risk factor for CHD)        LDL Reference Ranges  (U.S. Department of Health and Human Services ATP III Classifcations)    Optimal          <100 mg/dL  Near Optimal     100-129 mg/dL  Borderline High  130-159 mg/dL  High             160-189 mg/dL  Very High        >189 mg/dL    TSH [[id number]]  (Abnormal) Collected:  11/18/19 0503    Specimen:  Blood Updated:  11/18/19 0827     TSH <0.005 uIU/mL     T4, Free [[id number]]  (Normal) Collected:  11/18/19 0503    Specimen:  Blood Updated:  11/18/19 0811     Free T4 1.40 ng/dL     Cortisol [[id number]] Collected:  11/18/19 0503    Specimen:  Blood Updated:  11/18/19 0746     Cortisol <0.11 mcg/dL     Narrative:       Cortisol Reference Ranges:    Cortisol 6AM - 10AM Range: 6.02-18.40 mcg/dl  Cortisol 4PM - 8PM Range: 2.68-10.50 mcg/dl      CBC & Differential [[id number]] Collected:  11/18/19 0503    Specimen:  Blood Updated:  11/18/19 0654    Narrative:       The following orders  were created for panel order CBC & Differential.  Procedure                               Abnormality         Status                     ---------                               -----------         ------                     CBC Auto Differential[[id number]]        Normal              Final result                 Please view results for these tests on the individual orders.    CBC Auto Differential [[id number]]  (Normal) Collected:  11/18/19 0503    Specimen:  Blood Updated:  11/18/19 0654     WBC 7.05 10*3/mm3      RBC 4.48 10*6/mm3      Hemoglobin 12.9 g/dL      Hematocrit 40.6 %      MCV 90.6 fL      MCH 28.8 pg      MCHC 31.8 g/dL      RDW 12.6 %      RDW-SD 41.4 fl      MPV 10.0 fL      Platelets 267 10*3/mm3     Manual Differential [[id number]]  (Abnormal) Collected:  11/18/19 0503    Specimen:  Blood Updated:  11/18/19 0654     Neutrophil % 45.5 %      Lymphocyte % 47.5 %      Monocyte % 2.0 %      Eosinophil % 4.0 %      Basophil % 1.0 %      Neutrophils Absolute 3.21 10*3/mm3      Lymphocytes Absolute 3.35 10*3/mm3      Monocytes Absolute 0.14 10*3/mm3      Eosinophils Absolute 0.28 10*3/mm3      Basophils Absolute 0.07 10*3/mm3      RBC Morphology Normal     Smudge Cells Slight/1+     Platelet Morphology Normal    Basic Metabolic Panel [[id number]]  (Abnormal) Collected:  11/18/19 0503    Specimen:  Blood Updated:  11/18/19 0620     Glucose 90 mg/dL      BUN 5 mg/dL      Creatinine 0.64 mg/dL      Sodium 146 mmol/L      Potassium 4.0 mmol/L      Chloride 113 mmol/L      CO2 20.6 mmol/L      Calcium 8.7 mg/dL      eGFR Non African Amer 92 mL/min/1.73      BUN/Creatinine Ratio 7.8     Anion Gap 12.4 mmol/L     Narrative:       GFR Normal >60  Chronic Kidney Disease <60  Kidney Failure <15    ACTH [[id number]] Collected:  11/18/19 0503    Specimen:  Blood Updated:  11/18/19 0537    Cortisol [[id number]] Collected:  11/17/19 1554    Specimen:  Blood from Arm, Right Updated:  11/17/19 1716     Cortisol 0.41 mcg/dL      "Narrative:       Cortisol Reference Ranges:    Cortisol 6AM - 10AM Range: 6.02-18.40 mcg/dl  Cortisol 4PM - 8PM Range: 2.68-10.50 mcg/dl            Imaging Results (Last 24 Hours)     ** No results found for the last 24 hours. **          Assessment/Plan     Active Hospital Problems    Diagnosis  POA   • **Syncope and collapse [R55]  Yes   • Hypothyroidism [E03.9]  Yes   • Hyperlipidemia [E78.5]  Yes      Resolved Hospital Problems   No resolved problems to display.       Panhypopituitarism  Check thyroid panel  Continue levothyroxine 50 mcg oral daily  We will change prednisone to 5 mg in the morning and 2.5 mg in the evening.  Explained  to the patient to discuss about these changes with Dr. Collins Gerard.    Patient is already on steroid replacement given her pituitary surgery.  She has been reeducated to double up on the steroids when she is sick.  Sick day rules.    Prediabetes  Emphasized to watch her diet.    Discussed plan with nurse.     Thank you for your consultation.  Will follow the pt while in hospital.     Daniel Smith MD.  11/18/19  11:49 AM      EMR Dragon / transcription disclaimer:    \"Dictated utilizing Dragon dictation\".   "

## 2019-11-18 NOTE — PROGRESS NOTES
Adult Nutrition  Assessment/PES    Patient Name:  Mariana Gilbert  YOB: 1950  MRN: [id number]  Admit Date:  11/16/2019    Assessment Date:  11/18/2019    Comments:  Assessment triggered by MST score of 2 per nurse admission screen.  Admitted with syncope and collapse.  Exertional dyspnea x 6 months.  Stress test and ECHO today.  Upper GI showed large hiatal hernia, patient deciding whether she wants surgical repair or not.  Currently NPO.    RD to continue to follow.    Reason for Assessment     Row Name 11/18/19 0926          Reason for Assessment    Reason For Assessment  identified at risk by screening criteria     Diagnosis  gastrointestinal disease;cardiac disease;endocrine conditions;psychosocial GERD, HLD, hypothyroid, pituitary tumor, depression, IBS, adrenal insufficiency; adm with syncope and collapse     Identified At Risk by Screening Criteria  MST SCORE 2+;reduced oral intake over the last month         Nutrition/Diet History     Row Name 11/18/19 0926          Nutrition/Diet History    Typical Food/Fluid Intake  currently NPO         Anthropometrics     Row Name 11/18/19 0927 11/18/19 0500       Anthropometrics    Weight  --  66 kg (145 lb 8.1 oz)       Admit Weight    Admit Weight  -- 145# 11/18  --       Usual Body Weight (UBW)    Weight Loss Time Frame  142# 2 weeks ago  --       Body Mass Index (BMI)    BMI Assessment  BMI 25-29.9: overweight  --        Labs/Tests/Procedures/Meds     Row Name 11/18/19 0925          Labs/Procedures/Meds    Lab Results Reviewed  reviewed, pertinent     Lab Results Comments  Na, BUN        Diagnostic Tests/Procedures    Diagnostic Test/Procedure Reviewed  reviewed, pertinent     Diagnostic Test/Procedures Comments  stress test and ECHO today        Medications    Pertinent Medications Reviewed  reviewed, pertinent     Pertinent Medications Comments  lactobacillus acidophilus, synthroid, metamucil, protonix, prednisone, IVFs         Physical  Findings     Row Name 11/18/19 0928          Physical Findings    Overall Physical Appearance  overweight     Skin  -- B=20, intact         Estimated/Assessed Needs     Row Name 11/18/19 0928          Calculation Measurements    Weight Used For Calculations  66 kg (145 lb 8.1 oz)        Estimated/Assessed Needs       KCAL/KG    KCAL/KG  25 Kcal/Kg (kcal);20 Kcal/Kg (kcal)     20 Kcal/Kg (kcal)  1320     25 Kcal/Kg (kcal)  1650        Protein Requirements    Weight Used For Protein Calculations  66 kg (145 lb 8.1 oz)     Est Protein Requirement Amount (gms/kg)  1.0 gm protein     Estimated Protein Requirements (gms/day)  66        Fluid Requirements    Estimated Fluid Requirements (mL/day)  1980         Nutrition Prescription Ordered     Row Name 11/18/19 0928          Nutrition Prescription PO    Current PO Diet  NPO;Sips/ice chips         Problem/Interventions:  Problem 1     Row Name 11/18/19 0929          Nutrition Diagnoses Problem 1    Problem 1  Inadequate Intake/Infusion     Inadequate Intake Type  Oral     Macronutrient  Kcal;Protein     Etiology (related to)  MNT for Treatment/Condition     Signs/Symptoms (evidenced by)  NPO         Intervention Goal     Row Name 11/18/19 0929          Intervention Goal    General  Maintain nutrition;Reduce/improve symptoms;Disease management/therapy     PO  Initiate feeding     Weight  No significant weight loss         Nutrition Intervention     Row Name 11/18/19 0929          Nutrition Intervention    RD/Tech Action  Follow Tx progress;Care plan reviewd;Await begin PO         Education/Evaluation     Row Name 11/18/19 0929          Education    Education  Will Instruct as appropriate        Monitor/Evaluation    Monitor  Per protocol;I&O;Pertinent labs;Weight;Symptoms           Electronically signed by:  Jenn Sanabria RD  11/18/19 9:29 AM

## 2019-11-18 NOTE — PLAN OF CARE
Problem: Patient Care Overview  Goal: Plan of Care Review  Outcome: Ongoing (interventions implemented as appropriate)   11/18/19 9547   Coping/Psychosocial   Plan of Care Reviewed With patient   OTHER   Outcome Summary Bedside swallow eval completed. Recommend regular and thins, meds whole with thin. Recommend upright and reflux precautions.

## 2019-11-18 NOTE — THERAPY EVALUATION
Acute Care - Speech Language Pathology   Swallow Initial Evaluation [facility]     Patient Name: Mariana Gilbert  : 1950  MRN: [id number]  Today's Date: 2019               Admit Date: 2019    Visit Dx:     ICD-10-CM ICD-9-CM   1. Syncope and collapse R55 780.2   2. Hyponatremia E87.1 276.1   3. Non-intractable vomiting with nausea, unspecified vomiting type R11.2 787.01     Patient Active Problem List   Diagnosis   • Dyspnea on exertion   • Fatigue   • Syncope and collapse   • Hypothyroidism   • Hyperlipidemia     Past Medical History:   Diagnosis Date   • Arthritis    • Depression    • Exertional dyspnea    • Fatigue    • GERD (gastroesophageal reflux disease)    • Hyperlipidemia    • Hypothyroidism    • IBS (irritable bowel syndrome)    • Seasonal allergies      Past Surgical History:   Procedure Laterality Date   • BREAST BIOPSY Bilateral     benign pathology   • COLONOSCOPY N/A 2018    done at [facility]   • PARTIAL HYSTERECTOMY Bilateral     Bilateral ovaries in tact-Dr. Ulrich   • PITUITARY EXCISION      partial        SWALLOW EVALUATION (last 72 hours)      SLP Adult Swallow Evaluation     Row Name 19 1527                   Rehab Evaluation    Document Type  evaluation  -OC        Subjective Information  no complaints  -OC        Patient Observations  alert;cooperative;agree to therapy  -OC        Patient Effort  good  -OC        Symptoms Noted During/After Treatment  none  -OC           General Information    Patient Profile Reviewed  yes  -OC        Pertinent History Of Current Problem  Pt admitted with syncope and collapse. Hx of pituitary tumpor s/p removal,  GE refuls, IBS, Large hiatal hernia.  -OC        Current Method of Nutrition  regular textures;thin liquids  -OC        Precautions/Limitations, Vision  WFL with corrective lenses  -OC        Precautions/Limitations, Hearing  WFL  -OC        Prior Level of Function-Communication  WFL  -OC         Prior Level of Function-Swallowing  no diet consistency restrictions  -OC        Plans/Goals Discussed with  patient and family;agreed upon  -OC        Barriers to Rehab  none identified  -OC        Patient's Goals for Discharge  return home  -OC           Pain Assessment    Additional Documentation  Pain Scale: Numbers Pre/Post-Treatment (Group)  -OC           Pain Scale: Numbers Pre/Post-Treatment    Pain Scale: Numbers, Pretreatment  0/10 - no pain  -OC        Pain Scale: Numbers, Post-Treatment  0/10 - no pain  -OC           Oral Motor and Function    Dentition Assessment  natural, present and adequate  -OC        Secretion Management  WNL/WFL  -OC        Mucosal Quality  moist, healthy  -OC        Volitional Swallow  WFL  -OC        Volitional Cough  WFL  -OC           Oral Musculature and Cranial Nerve Assessment    Oral Motor General Assessment  WFL  -OC           Clinical Swallow Eval    Clinical Swallow Evaluation Summary  Pt presents with functional swallow. No overt s/s aspiration with thin via cup/straw, puree, mech soft, and regular textures.   -OC           Clinical Impression    SLP Swallowing Diagnosis  functional oral phase;functional pharyngeal phase  -OC        Functional Impact  no impact on function  -OC        Rehab Potential/Prognosis, Swallowing  good, to achieve stated therapy goals  -OC        Swallow Criteria for Skilled Therapeutic Interventions Met  baseline status  -OC           Recommendations    Therapy Frequency (Swallow)  evaluation only  -OC        Predicted Duration Therapy Intervention (Days)  until discharge  -OC        SLP Diet Recommendation  regular textures;thin liquids  -OC        Recommended Precautions and Strategies  upright posture during/after eating;other (see comments) reflux precautions  -OC        SLP Rec. for Method of Medication Administration  meds whole;with thin liquids  -OC        Monitor for Signs of Aspiration  yes  -OC        Anticipated Dischage  Disposition  home with assist  -OC          User Key  (r) = Recorded By, (t) = Taken By, (c) = Cosigned By    Initials Name Effective Dates    Esmer Yates MA, CCC-SLP 06/08/18 -           EDUCATION  The patient has been educated in the following areas:   Dysphagia (Swallowing Impairment).    SLP Recommendation and Plan  SLP Swallowing Diagnosis: functional oral phase, functional pharyngeal phase  SLP Diet Recommendation: regular textures, thin liquids  Recommended Precautions and Strategies: upright posture during/after eating, other (see comments)(reflux precautions)  SLP Rec. for Method of Medication Administration: meds whole, with thin liquids     Monitor for Signs of Aspiration: yes     Swallow Criteria for Skilled Therapeutic Interventions Met: baseline status  Anticipated Dischage Disposition: home with assist  Rehab Potential/Prognosis, Swallowing: good, to achieve stated therapy goals  Therapy Frequency (Swallow): evaluation only  Predicted Duration Therapy Intervention (Days): until discharge       Plan of Care Reviewed With: patient  Outcome Summary: Bedside swallow eval completed. Recommend regular and thins, meds whole with thin. Recommend upright and reflux precautions.          SLP Outcome Measures (last 72 hours)      SLP Outcome Measures     Row Name 11/18/19 1536             SLP Outcome Measures    Outcome Measure Used?  Adult NOMS  -OC         Adult FCM Scores    FCM Chosen  Swallowing  -OC      Swallowing FCM Score  7  -OC        User Key  (r) = Recorded By, (t) = Taken By, (c) = Cosigned By    Initials Name Effective Dates    Esmer Yates MA, CCC-CLARKE 06/08/18 -            Time Calculation:   Time Calculation- SLP     Row Name 11/18/19 1537             Time Calculation- SLP    SLP Start Time  1430  -OC      SLP Received On  11/18/19  -OC        User Key  (r) = Recorded By, (t) = Taken By, (c) = Cosigned By    Initials Name Provider Type    Esmer Yates MA, CCC-SLP Speech and Language  Pathologist          Therapy Charges for Today     Code Description Service Date Service Provider Modifiers Qty    [id number]  ST EVAL ORAL PHARYNG SWALLOW 3 11/18/2019 Esmer Hendrickson MA,CCC-SLP GN 1               Esmer Hendrickson MA,CCC-SLP  11/18/2019

## 2019-11-18 NOTE — PLAN OF CARE
Problem: Patient Care Overview  Goal: Plan of Care Review  Outcome: Ongoing (interventions implemented as appropriate)   11/18/19 3306   Coping/Psychosocial   Plan of Care Reviewed With patient   Plan of Care Review   Progress no change   OTHER   Outcome Summary VSS. Patient ambulating with assistance. Still with some dizziness and balance issues upon standing and walking. Stress test and echo to be completed today. Will continue to monitor.        Problem: Fall Risk (Adult)  Goal: Identify Related Risk Factors and Signs and Symptoms  Outcome: Outcome(s) achieved Date Met: 11/18/19    Goal: Absence of Fall  Outcome: Ongoing (interventions implemented as appropriate)

## 2019-11-18 NOTE — SIGNIFICANT NOTE
11/18/19 1034   Rehab Time/Intention   Evaluation Not Performed other (see comments)  (Pt not appropriate for bedside swallow eval at this time secondary to NPO for stress test per RN. ST to follow for bedside swallow eval as warranted. )   Rehab Treatment   Discipline speech language pathologist

## 2019-11-18 NOTE — NURSING NOTE
A.M. Orthostatic Vitals:    0656: Laying Flat: HR 86, /92    0658: Standing 1st 2 mins: , /84    0700: Standing 2nd 2 mins: , /93

## 2019-11-18 NOTE — PROGRESS NOTES
Discharge Planning Assessment  [facility]     Patient Name: Mariana Gilbert  MRN: [id number]  Today's Date: 11/17/2019    Admit Date: 11/16/2019    Discharge Needs Assessment     Row Name 11/17/19 0947       Living Environment    Lives With  spouse    Current Living Arrangements  home/apartment/condo    Primary Care Provided by  self    Provides Primary Care For  no one    Family Caregiver if Needed  spouse    Family Caregiver Names  Brady Gilbert--spouse    Quality of Family Relationships  helpful;involved;supportive    Able to Return to Prior Arrangements  yes       Resource/Environmental Concerns    Resource/Environmental Concerns  none    Transportation Concerns  car, none       Transition Planning    Patient/Family Anticipates Transition to  home with family    Patient/Family Anticipated Services at Transition  none    Transportation Anticipated  family or friend will provide       Discharge Needs Assessment    Readmission Within the Last 30 Days  no previous admission in last 30 days    Concerns to be Addressed  denies needs/concerns at this time    Equipment Currently Used at Home  other (see comments) wears a hearing aid in her right ear    Anticipated Changes Related to Illness  none    Equipment Needed After Discharge  none    Outpatient/Agency/Support Group Needs  -- n/a    Discharge Facility/Level of Care Needs  -- n/a    Offered/Gave Vendor List  no    Current Discharge Risk  -- n/a        Discharge Plan     Row Name 11/17/19 9002       Plan    Plan  Home via private auto with no anticipated needs    Patient/Family in Agreement with Plan  yes    Plan Comments  Introduced self/explained role of CCP. Face sheet data reviewed. CMS MCKEE letter completed. Pt lives at home with her spouse. She is IADLs. She uses no DME for ambulation. She wears a hearing aid in her RT ear. Denies previous use of HH or SNF stay. States she has done OP PT at the PT place in [facility]--she does not remember the  name of it. Plan at this time is for her spouse to drive her home at [place] with no anticipated needs. CCP to follow.................JW        Destination      No service coordination in this encounter.      Durable Medical Equipment      No service coordination in this encounter.      Dialysis/Infusion      No service coordination in this encounter.      Home Medical Care      No service coordination in this encounter.      Therapy      No service coordination in this encounter.      Community Resources      No service coordination in this encounter.          Demographic Summary     Row Name 11/17/19 3884       General Information    Admission Type  observation    Arrived From  home    Required Notices Provided  Observation Status Notice    Referral Source  admission list;physician    Reason for Consult  discharge planning    Preferred Language  English     Used During This Interaction  no       Contact Information    Permission Granted to Share Info With  ;family/designee        Functional Status     Row Name 11/17/19 1451       Functional Status    Usual Activity Tolerance  good    Current Activity Tolerance  fair       Functional Status, IADL    Medications  independent    Meal Preparation  independent    Housekeeping  independent    Laundry  independent    Shopping  independent       Mental Status    General Appearance WDL  WDL        Psychosocial    No documentation.       Abuse/Neglect    No documentation.       Legal    No documentation.       Substance Abuse    No documentation.       Patient Forms    No documentation.           Priya Whitney, RN

## 2019-11-19 LAB — ACTH PLAS-MCNC: <1.1 PG/ML (ref 7.2–63.3)

## 2019-12-12 ENCOUNTER — OFFICE VISIT (OUTPATIENT)
Dept: SURGERY | Facility: CLINIC | Age: 69
End: 2019-12-12

## 2019-12-12 VITALS — WEIGHT: 140.4 LBS | OXYGEN SATURATION: 98 % | HEART RATE: 86 BPM | HEIGHT: 63 IN | BODY MASS INDEX: 24.88 KG/M2

## 2019-12-12 DIAGNOSIS — K44.9 PARAESOPHAGEAL HERNIA: Primary | ICD-10-CM

## 2019-12-12 PROCEDURE — 99213 OFFICE O/P EST LOW 20 MIN: CPT | Performed by: SURGERY

## 2019-12-12 RX ORDER — CEFAZOLIN SODIUM 2 G/100ML
2 INJECTION, SOLUTION INTRAVENOUS ONCE
Status: CANCELLED | OUTPATIENT
Start: 2020-01-07 | End: 2019-12-12

## 2019-12-15 NOTE — PROGRESS NOTES
SUMMARY (A/P):    69-year-old lady with symptomatic paraesophageal hiatal hernia containing approximately one half of the stomach within the mediastinum.  Based on the degree of her symptomatology despite use of Protonix, she wishes to proceed with laparoscopic paraesophageal hiatal hernia repair.  She understands the rationale for the procedure, the nature of the procedure, and the risks including but not limited to bleeding, infection, conversion to open procedure, postoperative dysphasia, and long-term recurrence rate.  Duration of office visit was 20 minutes, 90% spent in counseling.      CC:    Follow-up hiatal hernia    HPI:    69-year-old lady whom I initially saw on 11/2/2019 secondary to chest x-ray suggesting moderate size hiatal hernia and many year history of gastroesophageal reflux symptoms.  She takes Protonix which helps with her symptoms to some degree but she does get intermittent dysphasia, indigestion, constant belching (which at times relieves her chest pain symptoms associated with this hernia), and even occasional nighttime aspiration.    PSH:    Anoscopy 2018  Partial pituitary excision  Partial hysterectomy 1978    PMH:    -Arthritis  -Depression  -Gastroesophageal reflux disease  -Hyperlipidemia  -Hypothyroidism    FAMILY HISTORY:    Negative for colorectal cancer    SOCIAL HISTORY:   Denies tobacco use  Occasional alcohol use    ALLERGIES: Sulfa-rash    MEDICATIONS:  Zyrtec 10 mg p.o. daily   Lexapro 20 mg p.o. daily   Synthroid 75 mcg p.o. daily   Protonix 40 mg p.o. daily   Mirapex 0.125 mg p.o.  Prednisone 2.5 mg p.o. nightly  Prednisone 5 mg p.o. in a.m. (started in November, to finish in mid December)    RADIOLOGY/ENDOSCOPY:    Upper GI 11/6/2019 at [facility] demonstrated large hiatal hernia with approximately half of the stomach displaced into the chest.  No other abnormality.    PHYSICAL EXAM:   Constitutional: Well-developed well-nourished, no acute  distress  Vital signs: HR 86, weight 140 pounds, height 63 inches, BMI 24.9  Eyes: Conjunctiva normal, sclera nonicteric  ENMT: Hearing grossly normal, oral mucosa moist  Neck: Supple  Respiratory: Normal inspiratory effort  Cardiovascular: Regular rate,  no jugular venous distention  Gastrointestinal: Soft, nontender  Skin:  Warm, dry, no rash on visualized skin surfaces  Musculoskeletal: Normal gait  Psychiatric: Alert and oriented ×3, normal affect     KAREN DOZIER M.D.

## 2019-12-30 ENCOUNTER — APPOINTMENT (OUTPATIENT)
Dept: PREADMISSION TESTING | Facility: HOSPITAL | Age: 69
End: 2019-12-30

## 2019-12-30 VITALS
DIASTOLIC BLOOD PRESSURE: 73 MMHG | WEIGHT: 145.8 LBS | TEMPERATURE: 97.5 F | HEIGHT: 63 IN | SYSTOLIC BLOOD PRESSURE: 135 MMHG | OXYGEN SATURATION: 98 % | RESPIRATION RATE: 16 BRPM | HEART RATE: 71 BPM | BODY MASS INDEX: 25.83 KG/M2

## 2019-12-30 LAB
ANION GAP SERPL CALCULATED.3IONS-SCNC: 11.8 MMOL/L (ref 5–15)
BUN BLD-MCNC: 8 MG/DL (ref 8–23)
BUN/CREAT SERPL: 10.5 (ref 7–25)
CALCIUM SPEC-SCNC: 9.1 MG/DL (ref 8.6–10.5)
CHLORIDE SERPL-SCNC: 105 MMOL/L (ref 98–107)
CO2 SERPL-SCNC: 24.2 MMOL/L (ref 22–29)
CREAT BLD-MCNC: 0.76 MG/DL (ref 0.57–1)
DEPRECATED RDW RBC AUTO: 42.8 FL (ref 37–54)
ERYTHROCYTE [DISTWIDTH] IN BLOOD BY AUTOMATED COUNT: 13.4 % (ref 12.3–15.4)
GFR SERPL CREATININE-BSD FRML MDRD: 75 ML/MIN/1.73
GLUCOSE BLD-MCNC: 97 MG/DL (ref 65–99)
HCT VFR BLD AUTO: 37.6 % (ref 34–46.6)
HGB BLD-MCNC: 12.8 G/DL (ref 12–15.9)
MCH RBC QN AUTO: 30 PG (ref 26.6–33)
MCHC RBC AUTO-ENTMCNC: 34 G/DL (ref 31.5–35.7)
MCV RBC AUTO: 88.3 FL (ref 79–97)
PLATELET # BLD AUTO: 241 10*3/MM3 (ref 140–450)
PMV BLD AUTO: 9.6 FL (ref 6–12)
POTASSIUM BLD-SCNC: 4.4 MMOL/L (ref 3.5–5.2)
RBC # BLD AUTO: 4.26 10*6/MM3 (ref 3.77–5.28)
SODIUM BLD-SCNC: 141 MMOL/L (ref 136–145)
WBC NRBC COR # BLD: 8.41 10*3/MM3 (ref 3.4–10.8)

## 2019-12-30 PROCEDURE — 36415 COLL VENOUS BLD VENIPUNCTURE: CPT

## 2019-12-30 PROCEDURE — 80048 BASIC METABOLIC PNL TOTAL CA: CPT | Performed by: SURGERY

## 2019-12-30 PROCEDURE — 85027 COMPLETE CBC AUTOMATED: CPT | Performed by: SURGERY

## 2019-12-30 RX ORDER — PREDNISONE 1 MG/1
7.5 TABLET ORAL DAILY
COMMUNITY

## 2020-01-07 ENCOUNTER — HOSPITAL ENCOUNTER (OUTPATIENT)
Facility: HOSPITAL | Age: 70
Discharge: HOME | End: 2020-01-08
Attending: SURGERY | Admitting: SURGERY

## 2020-01-07 ENCOUNTER — ANESTHESIA (OUTPATIENT)
Dept: PERIOP | Facility: HOSPITAL | Age: 70
End: 2020-01-07

## 2020-01-07 ENCOUNTER — ANESTHESIA EVENT (OUTPATIENT)
Dept: PERIOP | Facility: HOSPITAL | Age: 70
End: 2020-01-07

## 2020-01-07 DIAGNOSIS — K44.9 PARAESOPHAGEAL HERNIA: ICD-10-CM

## 2020-01-07 PROCEDURE — 25010000002 NEOSTIGMINE PER 0.5 MG: Performed by: NURSE ANESTHETIST, CERTIFIED REGISTERED

## 2020-01-07 PROCEDURE — 63710000001 HYDROCODONE-ACETAMINOPHEN 5-325 MG TABLET: Performed by: SURGERY

## 2020-01-07 PROCEDURE — C1781 MESH (IMPLANTABLE): HCPCS | Performed by: SURGERY

## 2020-01-07 PROCEDURE — 25010000002 PROPOFOL 10 MG/ML EMULSION: Performed by: NURSE ANESTHETIST, CERTIFIED REGISTERED

## 2020-01-07 PROCEDURE — 43282 LAP PARAESOPH HER RPR W/MESH: CPT | Performed by: SURGERY

## 2020-01-07 PROCEDURE — 25010000002 MIDAZOLAM PER 1 MG: Performed by: ANESTHESIOLOGY

## 2020-01-07 PROCEDURE — G0378 HOSPITAL OBSERVATION PER HR: HCPCS

## 2020-01-07 PROCEDURE — 25010000003 CEFAZOLIN IN DEXTROSE 2-4 GM/100ML-% SOLUTION: Performed by: SURGERY

## 2020-01-07 PROCEDURE — 25010000002 DEXAMETHASONE PER 1 MG: Performed by: NURSE ANESTHETIST, CERTIFIED REGISTERED

## 2020-01-07 PROCEDURE — 25010000002 HYDROMORPHONE PER 4 MG: Performed by: NURSE ANESTHETIST, CERTIFIED REGISTERED

## 2020-01-07 PROCEDURE — A9270 NON-COVERED ITEM OR SERVICE: HCPCS | Performed by: SURGERY

## 2020-01-07 PROCEDURE — 25010000002 ONDANSETRON PER 1 MG: Performed by: NURSE ANESTHETIST, CERTIFIED REGISTERED

## 2020-01-07 PROCEDURE — 63710000001 PRAMIPEXOLE 0.5 MG TABLET: Performed by: SURGERY

## 2020-01-07 PROCEDURE — 25010000002 FENTANYL CITRATE (PF) 100 MCG/2ML SOLUTION: Performed by: NURSE ANESTHETIST, CERTIFIED REGISTERED

## 2020-01-07 PROCEDURE — 63710000001 PREDNISONE PER 5 MG: Performed by: SURGERY

## 2020-01-07 DEVICE — SEALANT WND FIBRIN TISSEEL PREFIL/SYR/PRIMAFZ 10ML: Type: IMPLANTABLE DEVICE | Site: ABDOMEN | Status: FUNCTIONAL

## 2020-01-07 DEVICE — CLIP LIGAT VASC HORIZON TI MD/LG GRN 6CT: Type: IMPLANTABLE DEVICE | Site: ABDOMEN | Status: FUNCTIONAL

## 2020-01-07 DEVICE — MESH TISS REINFORCEMENT BIO/A 7X10CM: Type: IMPLANTABLE DEVICE | Site: ABDOMEN | Status: FUNCTIONAL

## 2020-01-07 RX ORDER — MAGNESIUM HYDROXIDE 1200 MG/15ML
LIQUID ORAL AS NEEDED
Status: DISCONTINUED | OUTPATIENT
Start: 2020-01-07 | End: 2020-01-07 | Stop reason: HOSPADM

## 2020-01-07 RX ORDER — DIPHENHYDRAMINE HCL 25 MG
25 CAPSULE ORAL
Status: DISCONTINUED | OUTPATIENT
Start: 2020-01-07 | End: 2020-01-07

## 2020-01-07 RX ORDER — PROMETHAZINE HYDROCHLORIDE 25 MG/ML
6.25 INJECTION, SOLUTION INTRAMUSCULAR; INTRAVENOUS
Status: DISCONTINUED | OUTPATIENT
Start: 2020-01-07 | End: 2020-01-07

## 2020-01-07 RX ORDER — PROPOFOL 10 MG/ML
VIAL (ML) INTRAVENOUS AS NEEDED
Status: DISCONTINUED | OUTPATIENT
Start: 2020-01-07 | End: 2020-01-07 | Stop reason: SURG

## 2020-01-07 RX ORDER — PROMETHAZINE HYDROCHLORIDE 25 MG/ML
6.25 INJECTION, SOLUTION INTRAMUSCULAR; INTRAVENOUS EVERY 4 HOURS PRN
Status: DISCONTINUED | OUTPATIENT
Start: 2020-01-07 | End: 2020-01-08 | Stop reason: HOSPADM

## 2020-01-07 RX ORDER — ONDANSETRON 2 MG/ML
4 INJECTION INTRAMUSCULAR; INTRAVENOUS ONCE AS NEEDED
Status: DISCONTINUED | OUTPATIENT
Start: 2020-01-07 | End: 2020-01-07

## 2020-01-07 RX ORDER — PRAMIPEXOLE DIHYDROCHLORIDE 0.5 MG/1
0.5 TABLET ORAL EVERY EVENING
Status: DISCONTINUED | OUTPATIENT
Start: 2020-01-07 | End: 2020-01-08 | Stop reason: HOSPADM

## 2020-01-07 RX ORDER — SODIUM CHLORIDE 9 MG/ML
75 INJECTION, SOLUTION INTRAVENOUS CONTINUOUS
Status: DISCONTINUED | OUTPATIENT
Start: 2020-01-07 | End: 2020-01-08

## 2020-01-07 RX ORDER — SODIUM CHLORIDE 9 MG/ML
INJECTION, SOLUTION INTRAVENOUS AS NEEDED
Status: DISCONTINUED | OUTPATIENT
Start: 2020-01-07 | End: 2020-01-07 | Stop reason: HOSPADM

## 2020-01-07 RX ORDER — NALOXONE HCL 0.4 MG/ML
0.1 VIAL (ML) INJECTION
Status: DISCONTINUED | OUTPATIENT
Start: 2020-01-07 | End: 2020-01-08 | Stop reason: HOSPADM

## 2020-01-07 RX ORDER — HYDRALAZINE HYDROCHLORIDE 20 MG/ML
5 INJECTION INTRAMUSCULAR; INTRAVENOUS
Status: DISCONTINUED | OUTPATIENT
Start: 2020-01-07 | End: 2020-01-07

## 2020-01-07 RX ORDER — ONDANSETRON 2 MG/ML
4 INJECTION INTRAMUSCULAR; INTRAVENOUS EVERY 6 HOURS PRN
Status: DISCONTINUED | OUTPATIENT
Start: 2020-01-07 | End: 2020-01-08 | Stop reason: HOSPADM

## 2020-01-07 RX ORDER — PROMETHAZINE HYDROCHLORIDE 25 MG/1
25 TABLET ORAL ONCE AS NEEDED
Status: DISCONTINUED | OUTPATIENT
Start: 2020-01-07 | End: 2020-01-07

## 2020-01-07 RX ORDER — NALOXONE HCL 0.4 MG/ML
0.2 VIAL (ML) INJECTION AS NEEDED
Status: DISCONTINUED | OUTPATIENT
Start: 2020-01-07 | End: 2020-01-07

## 2020-01-07 RX ORDER — MIDAZOLAM HYDROCHLORIDE 1 MG/ML
0.5 INJECTION INTRAMUSCULAR; INTRAVENOUS
Status: COMPLETED | OUTPATIENT
Start: 2020-01-07 | End: 2020-01-07

## 2020-01-07 RX ORDER — EPHEDRINE SULFATE 50 MG/ML
5 INJECTION, SOLUTION INTRAVENOUS ONCE AS NEEDED
Status: DISCONTINUED | OUTPATIENT
Start: 2020-01-07 | End: 2020-01-07

## 2020-01-07 RX ORDER — ACETAMINOPHEN 325 MG/1
650 TABLET ORAL ONCE AS NEEDED
Status: DISCONTINUED | OUTPATIENT
Start: 2020-01-07 | End: 2020-01-07

## 2020-01-07 RX ORDER — ONDANSETRON 2 MG/ML
INJECTION INTRAMUSCULAR; INTRAVENOUS AS NEEDED
Status: DISCONTINUED | OUTPATIENT
Start: 2020-01-07 | End: 2020-01-07 | Stop reason: SURG

## 2020-01-07 RX ORDER — DIPHENHYDRAMINE HYDROCHLORIDE 50 MG/ML
12.5 INJECTION INTRAMUSCULAR; INTRAVENOUS
Status: DISCONTINUED | OUTPATIENT
Start: 2020-01-07 | End: 2020-01-07

## 2020-01-07 RX ORDER — LIDOCAINE HYDROCHLORIDE 10 MG/ML
0.5 INJECTION, SOLUTION EPIDURAL; INFILTRATION; INTRACAUDAL; PERINEURAL ONCE AS NEEDED
Status: DISCONTINUED | OUTPATIENT
Start: 2020-01-07 | End: 2020-01-07 | Stop reason: HOSPADM

## 2020-01-07 RX ORDER — SODIUM CHLORIDE 0.9 % (FLUSH) 0.9 %
3-10 SYRINGE (ML) INJECTION AS NEEDED
Status: DISCONTINUED | OUTPATIENT
Start: 2020-01-07 | End: 2020-01-07 | Stop reason: HOSPADM

## 2020-01-07 RX ORDER — HYDROCODONE BITARTRATE AND ACETAMINOPHEN 5; 325 MG/1; MG/1
2 TABLET ORAL EVERY 4 HOURS PRN
Status: DISCONTINUED | OUTPATIENT
Start: 2020-01-07 | End: 2020-01-08 | Stop reason: HOSPADM

## 2020-01-07 RX ORDER — HYDROCODONE BITARTRATE AND ACETAMINOPHEN 5; 325 MG/1; MG/1
1 TABLET ORAL EVERY 4 HOURS PRN
Status: DISCONTINUED | OUTPATIENT
Start: 2020-01-07 | End: 2020-01-08 | Stop reason: HOSPADM

## 2020-01-07 RX ORDER — LABETALOL HYDROCHLORIDE 5 MG/ML
5 INJECTION, SOLUTION INTRAVENOUS
Status: DISCONTINUED | OUTPATIENT
Start: 2020-01-07 | End: 2020-01-07

## 2020-01-07 RX ORDER — HYDROCODONE BITARTRATE AND ACETAMINOPHEN 7.5; 325 MG/1; MG/1
1 TABLET ORAL ONCE AS NEEDED
Status: DISCONTINUED | OUTPATIENT
Start: 2020-01-07 | End: 2020-01-07

## 2020-01-07 RX ORDER — PANTOPRAZOLE SODIUM 40 MG/1
40 TABLET, DELAYED RELEASE ORAL DAILY
Status: DISCONTINUED | OUTPATIENT
Start: 2020-01-07 | End: 2020-01-08 | Stop reason: HOSPADM

## 2020-01-07 RX ORDER — BUPIVACAINE HYDROCHLORIDE AND EPINEPHRINE 5; 5 MG/ML; UG/ML
INJECTION, SOLUTION PERINEURAL AS NEEDED
Status: DISCONTINUED | OUTPATIENT
Start: 2020-01-07 | End: 2020-01-07 | Stop reason: HOSPADM

## 2020-01-07 RX ORDER — FAMOTIDINE 10 MG/ML
20 INJECTION, SOLUTION INTRAVENOUS ONCE
Status: COMPLETED | OUTPATIENT
Start: 2020-01-07 | End: 2020-01-07

## 2020-01-07 RX ORDER — LIDOCAINE HYDROCHLORIDE 20 MG/ML
INJECTION, SOLUTION INFILTRATION; PERINEURAL AS NEEDED
Status: DISCONTINUED | OUTPATIENT
Start: 2020-01-07 | End: 2020-01-07 | Stop reason: SURG

## 2020-01-07 RX ORDER — FENTANYL CITRATE 50 UG/ML
50 INJECTION, SOLUTION INTRAMUSCULAR; INTRAVENOUS
Status: DISCONTINUED | OUTPATIENT
Start: 2020-01-07 | End: 2020-01-07

## 2020-01-07 RX ORDER — LEVOTHYROXINE SODIUM 0.07 MG/1
75 TABLET ORAL DAILY
Status: DISCONTINUED | OUTPATIENT
Start: 2020-01-07 | End: 2020-01-08 | Stop reason: HOSPADM

## 2020-01-07 RX ORDER — SODIUM CHLORIDE 0.9 % (FLUSH) 0.9 %
3 SYRINGE (ML) INJECTION EVERY 12 HOURS SCHEDULED
Status: DISCONTINUED | OUTPATIENT
Start: 2020-01-07 | End: 2020-01-07 | Stop reason: HOSPADM

## 2020-01-07 RX ORDER — DEXAMETHASONE SODIUM PHOSPHATE 10 MG/ML
INJECTION INTRAMUSCULAR; INTRAVENOUS AS NEEDED
Status: DISCONTINUED | OUTPATIENT
Start: 2020-01-07 | End: 2020-01-07 | Stop reason: SURG

## 2020-01-07 RX ORDER — SODIUM CHLORIDE, SODIUM LACTATE, POTASSIUM CHLORIDE, CALCIUM CHLORIDE 600; 310; 30; 20 MG/100ML; MG/100ML; MG/100ML; MG/100ML
9 INJECTION, SOLUTION INTRAVENOUS CONTINUOUS
Status: DISCONTINUED | OUTPATIENT
Start: 2020-01-07 | End: 2020-01-07

## 2020-01-07 RX ORDER — ROCURONIUM BROMIDE 10 MG/ML
INJECTION, SOLUTION INTRAVENOUS AS NEEDED
Status: DISCONTINUED | OUTPATIENT
Start: 2020-01-07 | End: 2020-01-07 | Stop reason: SURG

## 2020-01-07 RX ORDER — HYDROMORPHONE HCL 110MG/55ML
PATIENT CONTROLLED ANALGESIA SYRINGE INTRAVENOUS AS NEEDED
Status: DISCONTINUED | OUTPATIENT
Start: 2020-01-07 | End: 2020-01-07 | Stop reason: SURG

## 2020-01-07 RX ORDER — PROMETHAZINE HYDROCHLORIDE 25 MG/1
25 SUPPOSITORY RECTAL ONCE AS NEEDED
Status: DISCONTINUED | OUTPATIENT
Start: 2020-01-07 | End: 2020-01-07

## 2020-01-07 RX ORDER — FLUMAZENIL 0.1 MG/ML
0.2 INJECTION INTRAVENOUS AS NEEDED
Status: DISCONTINUED | OUTPATIENT
Start: 2020-01-07 | End: 2020-01-07

## 2020-01-07 RX ORDER — EPHEDRINE SULFATE 50 MG/ML
INJECTION, SOLUTION INTRAVENOUS AS NEEDED
Status: DISCONTINUED | OUTPATIENT
Start: 2020-01-07 | End: 2020-01-07 | Stop reason: SURG

## 2020-01-07 RX ORDER — OXYCODONE AND ACETAMINOPHEN 7.5; 325 MG/1; MG/1
1 TABLET ORAL ONCE AS NEEDED
Status: DISCONTINUED | OUTPATIENT
Start: 2020-01-07 | End: 2020-01-07

## 2020-01-07 RX ORDER — FENTANYL CITRATE 50 UG/ML
INJECTION, SOLUTION INTRAMUSCULAR; INTRAVENOUS AS NEEDED
Status: DISCONTINUED | OUTPATIENT
Start: 2020-01-07 | End: 2020-01-07 | Stop reason: SURG

## 2020-01-07 RX ORDER — HYDROMORPHONE HYDROCHLORIDE 1 MG/ML
0.5 INJECTION, SOLUTION INTRAMUSCULAR; INTRAVENOUS; SUBCUTANEOUS
Status: DISCONTINUED | OUTPATIENT
Start: 2020-01-07 | End: 2020-01-07

## 2020-01-07 RX ORDER — ESCITALOPRAM OXALATE 20 MG/1
20 TABLET ORAL DAILY
Status: DISCONTINUED | OUTPATIENT
Start: 2020-01-07 | End: 2020-01-08 | Stop reason: HOSPADM

## 2020-01-07 RX ORDER — PROMETHAZINE HYDROCHLORIDE 25 MG/ML
12.5 INJECTION, SOLUTION INTRAMUSCULAR; INTRAVENOUS ONCE AS NEEDED
Status: DISCONTINUED | OUTPATIENT
Start: 2020-01-07 | End: 2020-01-07

## 2020-01-07 RX ORDER — CEFAZOLIN SODIUM 2 G/100ML
2 INJECTION, SOLUTION INTRAVENOUS ONCE
Status: COMPLETED | OUTPATIENT
Start: 2020-01-07 | End: 2020-01-07

## 2020-01-07 RX ORDER — GLYCOPYRROLATE 0.2 MG/ML
INJECTION INTRAMUSCULAR; INTRAVENOUS AS NEEDED
Status: DISCONTINUED | OUTPATIENT
Start: 2020-01-07 | End: 2020-01-07 | Stop reason: SURG

## 2020-01-07 RX ORDER — HYDROMORPHONE HYDROCHLORIDE 1 MG/ML
0.5 INJECTION, SOLUTION INTRAMUSCULAR; INTRAVENOUS; SUBCUTANEOUS
Status: DISCONTINUED | OUTPATIENT
Start: 2020-01-07 | End: 2020-01-08 | Stop reason: HOSPADM

## 2020-01-07 RX ADMIN — HYDROCODONE BITARTRATE AND ACETAMINOPHEN 2 TABLET: 5; 325 TABLET ORAL at 21:05

## 2020-01-07 RX ADMIN — PRAMIPEXOLE DIHYDROCHLORIDE 0.5 MG: 0.5 TABLET ORAL at 21:05

## 2020-01-07 RX ADMIN — SODIUM CHLORIDE, POTASSIUM CHLORIDE, SODIUM LACTATE AND CALCIUM CHLORIDE 9 ML/HR: 600; 310; 30; 20 INJECTION, SOLUTION INTRAVENOUS at 10:13

## 2020-01-07 RX ADMIN — NEOSTIGMINE METHYLSULFATE 4 MG: 1 INJECTION INTRAMUSCULAR; INTRAVENOUS; SUBCUTANEOUS at 14:11

## 2020-01-07 RX ADMIN — DEXAMETHASONE SODIUM PHOSPHATE 8 MG: 10 INJECTION INTRAMUSCULAR; INTRAVENOUS at 12:44

## 2020-01-07 RX ADMIN — EPHEDRINE SULFATE 10 MG: 50 INJECTION INTRAVENOUS at 12:57

## 2020-01-07 RX ADMIN — LIDOCAINE HYDROCHLORIDE 100 MG: 20 INJECTION, SOLUTION INFILTRATION; PERINEURAL at 12:34

## 2020-01-07 RX ADMIN — PREDNISONE 7.5 MG: 2.5 TABLET ORAL at 21:02

## 2020-01-07 RX ADMIN — FENTANYL CITRATE 100 MCG: 50 INJECTION INTRAMUSCULAR; INTRAVENOUS at 12:29

## 2020-01-07 RX ADMIN — SODIUM CHLORIDE, POTASSIUM CHLORIDE, SODIUM LACTATE AND CALCIUM CHLORIDE: 600; 310; 30; 20 INJECTION, SOLUTION INTRAVENOUS at 12:28

## 2020-01-07 RX ADMIN — GLYCOPYRROLATE 0.4 MG: 0.2 INJECTION INTRAMUSCULAR; INTRAVENOUS at 14:11

## 2020-01-07 RX ADMIN — ONDANSETRON HYDROCHLORIDE 4 MG: 2 SOLUTION INTRAMUSCULAR; INTRAVENOUS at 14:08

## 2020-01-07 RX ADMIN — LABETALOL 20 MG/4 ML (5 MG/ML) INTRAVENOUS SYRINGE 5 MG: at 14:34

## 2020-01-07 RX ADMIN — FAMOTIDINE 20 MG: 10 INJECTION INTRAVENOUS at 10:13

## 2020-01-07 RX ADMIN — MIDAZOLAM 0.5 MG: 1 INJECTION INTRAMUSCULAR; INTRAVENOUS at 10:14

## 2020-01-07 RX ADMIN — CEFAZOLIN SODIUM 2 G: 2 INJECTION, SOLUTION INTRAVENOUS at 12:30

## 2020-01-07 RX ADMIN — ROCURONIUM BROMIDE 10 MG: 10 INJECTION INTRAVENOUS at 13:35

## 2020-01-07 RX ADMIN — ROCURONIUM BROMIDE 10 MG: 10 INJECTION INTRAVENOUS at 13:55

## 2020-01-07 RX ADMIN — LABETALOL 20 MG/4 ML (5 MG/ML) INTRAVENOUS SYRINGE 5 MG: at 15:21

## 2020-01-07 RX ADMIN — HYDROMORPHONE HYDROCHLORIDE 1 MG: 2 INJECTION, SOLUTION INTRAMUSCULAR; INTRAVENOUS; SUBCUTANEOUS at 13:04

## 2020-01-07 RX ADMIN — FENTANYL CITRATE 50 MCG: 50 INJECTION INTRAMUSCULAR; INTRAVENOUS at 13:08

## 2020-01-07 RX ADMIN — PROPOFOL 150 MG: 10 INJECTION, EMULSION INTRAVENOUS at 12:34

## 2020-01-07 RX ADMIN — GLYCOPYRROLATE 0.3 MG: 0.2 INJECTION INTRAMUSCULAR; INTRAVENOUS at 12:50

## 2020-01-07 RX ADMIN — ROCURONIUM BROMIDE 40 MG: 10 INJECTION INTRAVENOUS at 12:34

## 2020-01-07 NOTE — ANESTHESIA PROCEDURE NOTES
Airway  Urgency: elective    Date/Time: 1/7/2020 12:34 PM  Airway not difficult    General Information and Staff    Patient location during procedure: OR  Anesthesiologist: Robert Escobedo MD  CRNA: Shantal Steven CRNA    Indications and Patient Condition  Indications for airway management: airway protection    Preoxygenated: yes  MILS not maintained throughout  Mask difficulty assessment: 1 - vent by mask    Final Airway Details  Final airway type: endotracheal airway      Successful airway: ETT  Cuffed: yes   Successful intubation technique: direct laryngoscopy  Endotracheal tube insertion site: oral  Blade: Tino  Blade size: 3  ETT size (mm): 7.0  Cormack-Lehane Classification: grade I - full view of glottis  Placement verified by: chest auscultation and capnometry   Cuff volume (mL): 7  Measured from: lips  ETT/EBT  to lips (cm): 21  Number of attempts at approach: 1    Additional Comments  Pt preoxygenated prior to induction, easy mask airway, atraumatic intubation,+ ETCO2, + bs bilat,  ETT secured and connected to ventilator.

## 2020-01-07 NOTE — PLAN OF CARE
Problem: Patient Care Overview  Goal: Plan of Care Review  Outcome: Ongoing (interventions implemented as appropriate)  Flowsheets (Taken 1/7/2020 5110)  Progress: no change  Plan of Care Reviewed With: patient; spouse  Outcome Summary: Arrived from PACU @ 1715. Sleeping between care. Clear liq diet. No c/o pain or N/V. VSS. Was given Labetalol x2 in PACU. DTV.

## 2020-01-07 NOTE — ANESTHESIA POSTPROCEDURE EVALUATION
Patient: Mariana Gilbert    Procedure Summary     Date:  01/07/20 Room / Location:  [facility] OR 06 / [facility] MAIN OR    Anesthesia Start:  1228 Anesthesia Stop:      Procedure:  LAPAROSCOPIC PARAESOPHAGEAL HERNIA REPAIR WITH MESH (N/A Abdomen) Diagnosis:       Paraesophageal hernia      (Paraesophageal hernia [K44.9])    Surgeon:  Sunil Velazquez MD Provider:  Robert Escobedo MD    Anesthesia Type:  general ASA Status:  3          Anesthesia Type: general    Vitals  Vitals Value Taken Time   /67 1/7/2020  3:24 PM   Temp 36.4 °C (97.5 °F) 1/7/2020  2:27 PM   Pulse 80 1/7/2020  3:28 PM   Resp 16 1/7/2020  2:45 PM   SpO2 91 % 1/7/2020  3:30 PM   Vitals shown include unvalidated device data.        Post Anesthesia Care and Evaluation    Patient location during evaluation: PACU  Patient participation: complete - patient participated  Level of consciousness: awake and alert  Pain management: adequate  Airway patency: patent  Anesthetic complications: No anesthetic complications    Cardiovascular status: acceptable  Respiratory status: acceptable  Hydration status: acceptable    Comments: --------------------            01/07/20               1521     --------------------   BP:     (!) 188/83   Pulse:      94       Resp:                Temp:                SpO2:               --------------------

## 2020-01-07 NOTE — OP NOTE
PREOPERATIVE DIAGNOSIS:  Paraesophageal hernia    POSTOPERATIVE DIAGNOSIS (FINDINGS):  Same    PROCEDURE:  Laparoscopic paraesophageal hiatal hernia repair with mesh    SURGEON:  Sunil Velazquez MD    ASSISTANT:  Sarah Bear    ANESTHESIA:  General    EBL:  Minimal    SPECIMEN(S):  none    DESCRIPTION:  In supine position under general anesthetic prepped and draped usual sterile manner.  Small incision made above the umbilicus and Veress needle inserted with upwards traction on the abdominal wall.  Abdomen insufflated 15 mmHg.  5 mm Optiview trocar inserted followed by right subxiphoid 5, right subcostal 5, left subcostal 10, and left lateral subcostal 5 mm trochars.  Stomach was pulled from the mediastinum.  Approximately half of the stomach is been within the mediastinum.  Several short gastrics were divided with the harmonic scalpel and the left rula was exposed and mediastinum entered and hernia sac partially removed from the mediastinum.  Gastropathic ligament was then divided and right rula was exposed and hernia sac was  from the rula and completion dissection of the hernia sac from the mediastinum was performed.  Hernia sac was  from the esophagus and stomach and removed.  Penrose drain was placed around the posterior vagus nerve and esophagus and retracted inferiorly.  Mediastinal dissection was completed resulting in several centimeters of intra-abdominal esophageal length without tension.  Crura were reapproximated with interrupted 0 silk suture until appropriate hiatal opening was achieved.  [place] [employer] a mesh was applied to the hiatal repair and secured with interrupted 2-0 silk.  Posterior fundus was pulled through the retroesophageal window and secured to the anterior fundus at the 9 o'clock position the esophagus with 3 interrupted 0 silk sutures resulting in a 2 cm floppy wrap.  Superior portion of the wrap was secured to the diaphragm with 2-0 silk.  Good hemostasis was noted and 10  mL of Tisseel was applied to the mesh and fundoplication.  CO2 was released.  Skin edges were closed with 5-0 Vicryl subcuticular followed by Dermabond.  Tolerated well, stable to recovery room.    Sunil Velazquez M.D.

## 2020-01-07 NOTE — ANESTHESIA POSTPROCEDURE EVALUATION
Patient: Mariana Gilbert    Procedure Summary     Date:  01/07/20 Room / Location:  [facility] OR 06 / [facility] MAIN OR    Anesthesia Start:  1228 Anesthesia Stop:  1429    Procedure:  LAPAROSCOPIC PARAESOPHAGEAL HERNIA REPAIR WITH MESH (N/A Abdomen) Diagnosis:       Paraesophageal hernia      (Paraesophageal hernia [K44.9])    Surgeon:  Sunil Velazquez MD Provider:  Robert Escobedo MD    Anesthesia Type:  general ASA Status:  3          Anesthesia Type: general    Vitals  Vitals Value Taken Time   /73 1/7/2020  3:30 PM   Temp 36.4 °C (97.5 °F) 1/7/2020  2:27 PM   Pulse 76 1/7/2020  3:31 PM   Resp 16 1/7/2020  2:45 PM   SpO2 91 % 1/7/2020  3:31 PM   Vitals shown include unvalidated device data.        Post Anesthesia Care and Evaluation    Patient location during evaluation: PACU  Patient participation: complete - patient participated  Level of consciousness: awake and alert  Pain management: adequate  Airway patency: patent  Anesthetic complications: No anesthetic complications    Cardiovascular status: acceptable  Respiratory status: acceptable  Hydration status: acceptable    Comments: --------------------            01/07/20               1521     --------------------   BP:     (!) 188/83   Pulse:      94       Resp:                Temp:                SpO2:               --------------------

## 2020-01-07 NOTE — ANESTHESIA PREPROCEDURE EVALUATION
Anesthesia Evaluation     Patient summary reviewed and Nursing notes reviewed   history of anesthetic complications:  NPO Solid Status: > 8 hours  NPO Liquid Status: > 2 hours           Airway   Mallampati: III  TM distance: >3 FB  Neck ROM: full  Possible difficult intubation  Dental - normal exam     Pulmonary - normal exam    breath sounds clear to auscultation  (+) shortness of breath (resolved with increase in steroids),   Cardiovascular - normal exam    Rhythm: regular  Rate: normal    (+) hyperlipidemia,       Neuro/Psych  (+) syncope, psychiatric history Depression,       ROS Comment: Restless legs    GI/Hepatic/Renal/Endo    (+)  hiatal hernia, GERD,      ROS Comment: Adrenal insufficiency  IBS    Musculoskeletal (-) negative ROS    Abdominal  - normal exam   Substance History - negative use     OB/GYN negative ob/gyn ROS         Other                        Anesthesia Plan    ASA 3     general     intravenous induction     Anesthetic plan, all risks, benefits, and alternatives have been provided, discussed and informed consent has been obtained with: patient.

## 2020-01-08 VITALS
BODY MASS INDEX: 25.98 KG/M2 | RESPIRATION RATE: 16 BRPM | SYSTOLIC BLOOD PRESSURE: 120 MMHG | WEIGHT: 146.61 LBS | OXYGEN SATURATION: 95 % | DIASTOLIC BLOOD PRESSURE: 79 MMHG | HEIGHT: 63 IN | HEART RATE: 77 BPM | TEMPERATURE: 97.4 F

## 2020-01-08 PROBLEM — K44.9 PARAESOPHAGEAL HERNIA: Status: RESOLVED | Noted: 2019-12-12 | Resolved: 2020-01-08

## 2020-01-08 LAB
ANION GAP SERPL CALCULATED.3IONS-SCNC: 13.3 MMOL/L (ref 5–15)
BASOPHILS # BLD AUTO: 0.01 10*3/MM3 (ref 0–0.2)
BASOPHILS NFR BLD AUTO: 0.1 % (ref 0–1.5)
BUN BLD-MCNC: 13 MG/DL (ref 8–23)
BUN/CREAT SERPL: 20.6 (ref 7–25)
CALCIUM SPEC-SCNC: 8.3 MG/DL (ref 8.6–10.5)
CHLORIDE SERPL-SCNC: 100 MMOL/L (ref 98–107)
CO2 SERPL-SCNC: 23.7 MMOL/L (ref 22–29)
CREAT BLD-MCNC: 0.63 MG/DL (ref 0.57–1)
DEPRECATED RDW RBC AUTO: 45.4 FL (ref 37–54)
EOSINOPHIL # BLD AUTO: 0 10*3/MM3 (ref 0–0.4)
EOSINOPHIL NFR BLD AUTO: 0 % (ref 0.3–6.2)
ERYTHROCYTE [DISTWIDTH] IN BLOOD BY AUTOMATED COUNT: 13.3 % (ref 12.3–15.4)
GFR SERPL CREATININE-BSD FRML MDRD: 94 ML/MIN/1.73
GLUCOSE BLD-MCNC: 140 MG/DL (ref 65–99)
HCT VFR BLD AUTO: 37.2 % (ref 34–46.6)
HGB BLD-MCNC: 12.3 G/DL (ref 12–15.9)
IMM GRANULOCYTES # BLD AUTO: 0.06 10*3/MM3 (ref 0–0.05)
IMM GRANULOCYTES NFR BLD AUTO: 0.6 % (ref 0–0.5)
LYMPHOCYTES # BLD AUTO: 1.39 10*3/MM3 (ref 0.7–3.1)
LYMPHOCYTES NFR BLD AUTO: 13.4 % (ref 19.6–45.3)
MCH RBC QN AUTO: 30.4 PG (ref 26.6–33)
MCHC RBC AUTO-ENTMCNC: 33.1 G/DL (ref 31.5–35.7)
MCV RBC AUTO: 91.9 FL (ref 79–97)
MONOCYTES # BLD AUTO: 0.66 10*3/MM3 (ref 0.1–0.9)
MONOCYTES NFR BLD AUTO: 6.3 % (ref 5–12)
NEUTROPHILS # BLD AUTO: 8.29 10*3/MM3 (ref 1.7–7)
NEUTROPHILS NFR BLD AUTO: 79.6 % (ref 42.7–76)
NRBC BLD AUTO-RTO: 0 /100 WBC (ref 0–0.2)
PLATELET # BLD AUTO: 255 10*3/MM3 (ref 140–450)
PMV BLD AUTO: 9.6 FL (ref 6–12)
POTASSIUM BLD-SCNC: 4.9 MMOL/L (ref 3.5–5.2)
RBC # BLD AUTO: 4.05 10*6/MM3 (ref 3.77–5.28)
SODIUM BLD-SCNC: 137 MMOL/L (ref 136–145)
WBC NRBC COR # BLD: 10.41 10*3/MM3 (ref 3.4–10.8)

## 2020-01-08 PROCEDURE — 80048 BASIC METABOLIC PNL TOTAL CA: CPT | Performed by: SURGERY

## 2020-01-08 PROCEDURE — 63710000001 PREDNISONE PER 5 MG: Performed by: SURGERY

## 2020-01-08 PROCEDURE — A9270 NON-COVERED ITEM OR SERVICE: HCPCS | Performed by: SURGERY

## 2020-01-08 PROCEDURE — G0378 HOSPITAL OBSERVATION PER HR: HCPCS

## 2020-01-08 PROCEDURE — 63710000001 LEVOTHYROXINE 75 MCG TABLET: Performed by: SURGERY

## 2020-01-08 PROCEDURE — 63710000001 PANTOPRAZOLE 40 MG TABLET DELAYED-RELEASE: Performed by: SURGERY

## 2020-01-08 PROCEDURE — 63710000001 ESCITALOPRAM 20 MG TABLET: Performed by: SURGERY

## 2020-01-08 PROCEDURE — 85025 COMPLETE CBC W/AUTO DIFF WBC: CPT | Performed by: SURGERY

## 2020-01-08 PROCEDURE — 63710000001 HYDROCODONE-ACETAMINOPHEN 5-325 MG TABLET: Performed by: SURGERY

## 2020-01-08 PROCEDURE — 25010000002 ENOXAPARIN PER 10 MG: Performed by: SURGERY

## 2020-01-08 RX ORDER — PROMETHAZINE HYDROCHLORIDE 12.5 MG/1
12.5 TABLET ORAL EVERY 6 HOURS PRN
Qty: 30 TABLET | Refills: 0 | Status: SHIPPED | OUTPATIENT
Start: 2020-01-08 | End: 2020-01-16

## 2020-01-08 RX ORDER — AMOXICILLIN 250 MG
2 CAPSULE ORAL DAILY PRN
Qty: 30 TABLET | Refills: 1 | Status: SHIPPED | OUTPATIENT
Start: 2020-01-08 | End: 2020-01-16

## 2020-01-08 RX ADMIN — LEVOTHYROXINE SODIUM 75 MCG: 75 TABLET ORAL at 09:02

## 2020-01-08 RX ADMIN — SODIUM CHLORIDE 75 ML/HR: 9 INJECTION, SOLUTION INTRAVENOUS at 07:08

## 2020-01-08 RX ADMIN — HYDROCODONE BITARTRATE AND ACETAMINOPHEN 2 TABLET: 5; 325 TABLET ORAL at 17:39

## 2020-01-08 RX ADMIN — HYDROCODONE BITARTRATE AND ACETAMINOPHEN 2 TABLET: 5; 325 TABLET ORAL at 07:42

## 2020-01-08 RX ADMIN — HYDROCODONE BITARTRATE AND ACETAMINOPHEN 2 TABLET: 5; 325 TABLET ORAL at 13:32

## 2020-01-08 RX ADMIN — PREDNISONE 7.5 MG: 2.5 TABLET ORAL at 09:02

## 2020-01-08 RX ADMIN — ESCITALOPRAM 20 MG: 20 TABLET, FILM COATED ORAL at 09:02

## 2020-01-08 RX ADMIN — HYDROCODONE BITARTRATE AND ACETAMINOPHEN 2 TABLET: 5; 325 TABLET ORAL at 01:37

## 2020-01-08 RX ADMIN — ENOXAPARIN SODIUM 40 MG: 40 INJECTION SUBCUTANEOUS at 09:02

## 2020-01-08 RX ADMIN — PANTOPRAZOLE SODIUM 40 MG: 40 TABLET, DELAYED RELEASE ORAL at 09:02

## 2020-01-08 NOTE — DISCHARGE SUMMARY
Admission date: 1/7/2020   Discharge date: 1/8/19.     Admission diagnosis: Paraesophageal hernia [K44.9]  Paraesophageal hernia [K44.9]     Discharge diagnosis: Paraesophageal hernia     Procedure: Laparoscopic paraesophageal hernia repair with mesh    Hospital course: The patient was admitted to the hospital after undergoing laparoscopic paraesophageal hernia repair with mesh placement.  Her postoperative course was uneventful.  She tolerated a liquid diet on postoperative day 1.  She was advanced to full liquid diet on postoperative day 2.  She did not have any nausea, vomiting or dysphagia.  Her abdominal pain was well-controlled on p.o. pain medications.  Decision was to discharge the patient home in stable condition     Meds:      Your medication list      START taking these medications      Instructions Last Dose Given Next Dose Due   HYDROcodone-acetaminophen 7.5-325 MG/15ML solution  Commonly known as:  HYCET      Take 15 mL by mouth Every 6 (Six) Hours As Needed for Moderate Pain .       promethazine 12.5 MG tablet  Commonly known as:  PHENERGAN      Take 1 tablet by mouth Every 6 (Six) Hours As Needed for Nausea or Vomiting for up to 30 doses.       sennosides-docusate 8.6-50 MG per tablet  Commonly known as:  PERICOLACE      Take 2 tablets by mouth Daily As Needed for Constipation.          CONTINUE taking these medications      Instructions Last Dose Given Next Dose Due   cetirizine 10 MG tablet  Commonly known as:  zyrTEC      Take 10 mg by mouth Daily.       difluprednate 0.05 % ophthalmic emulsion  Commonly known as:  DUREZOL      Administer 1 drop to the right eye Daily.       escitalopram 20 MG tablet  Commonly known as:  LEXAPRO      Take 20 mg by mouth Daily.       HIBICLENS EX      Apply  topically. AS DIRECTED PREOP       levothyroxine 75 MCG tablet  Commonly known as:  SYNTHROID, LEVOTHROID      Take 75 mcg by mouth Daily.       pantoprazole 40 MG EC tablet  Commonly known as:  PROTONIX       Take 40 mg by mouth Daily.       pramipexole 0.125 MG tablet  Commonly known as:  MIRAPEX      Take 0.5 mg by mouth Every Evening.       predniSONE 5 MG tablet  Commonly known as:  DELTASONE      Take 7.5 mg by mouth Daily.       PROBIOTIC PO      Take 1 capsule by mouth Daily. Culturelle OTC       psyllium 58.6 % packet  Commonly known as:  METAMUCIL      Take 1 packet by mouth Daily.       timolol 0.25 % ophthalmic solution  Commonly known as:  TIMOPTIC      Administer 1 drop into the left eye Daily.             Where to Get Your Medications      These medications were sent to [facility] - [place] [address]    Hours:  7:00AM-6PM Mon-Fri Phone:  [phone]   · HYDROcodone-acetaminophen 7.5-325 MG/15ML solution  · promethazine 12.5 MG tablet  · sennosides-docusate 8.6-50 MG per tablet         Instructions:    - No heavy lifting of more than 15 lb for 6 weeks. Can start doing aerobic type exercise in 4 weeks.   - No driving while taking pain medications  - Take medications as prescribed.   - Can shower, no bath tub  - Full liquid diet 2 weeks    Follow up: Dr. Velazquez in 2 weeks, call for appointment      Gerber Hess MD  General, Minimally Invasive and Endoscopic Surgery  [facility]

## 2020-01-08 NOTE — PROGRESS NOTES
Discharge Planning Assessment  [facility]     Patient Name: Mariana Gilbert  MRN: [id number]  Today's Date: 1/8/2020    Admit Date: 1/7/2020    Discharge Needs Assessment     Row Name 01/08/20 1556       Living Environment    Lives With  spouse    Name(s) of Who Lives With Patient  -- : Brady    Current Living Arrangements  home/apartment/condo    Primary Care Provided by  self    Provides Primary Care For  no one    Family Caregiver if Needed  spouse    Family Caregiver Names  -- : Brady    Quality of Family Relationships  supportive       Resource/Environmental Concerns    Resource/Environmental Concerns  none    Transportation Concerns  car, none       Transition Planning    Patient/Family Anticipates Transition to  home with family    Patient/Family Anticipated Services at Transition  none       Discharge Needs Assessment    Readmission Within the Last 30 Days  no previous admission in last 30 days    Concerns to be Addressed  no discharge needs identified;denies needs/concerns at this time;adjustment to diagnosis/illness    Equipment Currently Used at Home  none    Anticipated Changes Related to Illness  none    Equipment Needed After Discharge  none        Discharge Plan     Row Name 01/08/20 1552       Plan    Plan  Home w/o needs    Provided post acute provider list?  Yes    Post Acute Provider Lists  Home Health    Post Acuite Provider List  Delivered    Delivered To  Patient    Method of Delivery  In person    Patient/Family in Agreement with Plan  yes    Plan Comments  I met with pt and her  Brady, pt confirmed the address, PCP and pharmacy are correct.  Pt said she has POA paperwork and her  is her POA (I request a copy be provided to the hospital).  Pt said she can afford her Rx, drivses and has transportation, has never had home health or been to a SNF and plans to return home at discharge and does not anticpate any discharge needs.      Rose Mary Funes RN      Demographic Summary     Row Name 01/08/20 1555       General Information    Referral Source  admission list    Reason for Consult  discharge planning    Preferred Language  English     Used During This Interaction  no    Row Name 01/08/20 1552       General Information    Admission Type  observation    Arrived From  home    Required Notices Provided  Observation Status Notice Pt signed [facility] Observation Letter, copy of letter given to her        Functional Status     Row Name 01/08/20 1555       Functional Status, IADL    Medications  independent    Meal Preparation  independent    Housekeeping  independent    Laundry  independent    Shopping  independent     Patient Forms     Row Name 01/08/20 1551       Patient Forms    Provider Choice List  Delivered    Delivered to  Patient    Method of delivery  In person    Patient Observation Letter  Delivered    Delivered to  Patient Pt signed [facility]EE Observation Letter, copy of letter given to her    Method of delivery  In person            Rose Mary Funes RN

## 2020-01-08 NOTE — PLAN OF CARE
Problem: Patient Care Overview  Goal: Plan of Care Review  Outcome: Ongoing (interventions implemented as appropriate)  Flowsheets (Taken 1/8/2020 0600)  Progress: improving  Plan of Care Reviewed With: patient  Outcome Summary: Medicated wih norco for pain with good pain conrol. Lap sites x5 with dermabond dry and intact. small amt of bruising at lap sites. Afebrile and VS stable. Voiding without difficulty.

## 2020-01-10 NOTE — PROGRESS NOTES
Discharge Planning Assessment  [facility]     Patient Name: Mariana Gilbert  MRN: [id number]  Today's Date: 1/10/2020    Admit Date: 1/7/2020      Discharge Plan     Row Name 01/10/20 0751       Plan    Plan Comments  Discharged. No discharge needs identified.     Final Discharge Disposition Code  01 - home or self-care     Expected Discharge Date and Time     Expected Discharge Date Expected Discharge Time    Jan 8, 2020      Rose Mary Funes RN

## 2020-01-16 ENCOUNTER — OFFICE VISIT (OUTPATIENT)
Dept: SURGERY | Facility: CLINIC | Age: 70
End: 2020-01-16

## 2020-01-16 DIAGNOSIS — Z09 FOLLOW UP: Primary | ICD-10-CM

## 2020-01-16 PROCEDURE — 99024 POSTOP FOLLOW-UP VISIT: CPT | Performed by: SURGERY

## 2020-01-16 NOTE — PROGRESS NOTES
Postoperative visit    Laparoscopic paraesophageal hiatal hernia repair with mesh 1/7/2020    Office visit: Incisions are healing well and she is doing well.  She is having no dysphasia and no reflux.  She is free to liberalize her diet and return to see me as needed.

## 2020-06-03 ENCOUNTER — OFFICE VISIT (OUTPATIENT)
Dept: GASTROENTEROLOGY | Facility: CLINIC | Age: 70
End: 2020-06-03

## 2020-06-03 VITALS
TEMPERATURE: 97.3 F | RESPIRATION RATE: 16 BRPM | SYSTOLIC BLOOD PRESSURE: 130 MMHG | OXYGEN SATURATION: 98 % | WEIGHT: 156 LBS | HEART RATE: 80 BPM | DIASTOLIC BLOOD PRESSURE: 80 MMHG | HEIGHT: 63 IN | BODY MASS INDEX: 27.64 KG/M2

## 2020-06-03 DIAGNOSIS — Z87.19 STATUS POST REPAIR OF PARAESOPHAGEAL DIAPHRAGMATIC HERNIA: ICD-10-CM

## 2020-06-03 DIAGNOSIS — Z98.890 STATUS POST REPAIR OF PARAESOPHAGEAL DIAPHRAGMATIC HERNIA: ICD-10-CM

## 2020-06-03 DIAGNOSIS — R19.4 CHANGE IN BOWEL HABITS: Primary | ICD-10-CM

## 2020-06-03 DIAGNOSIS — R19.7 DIARRHEA, UNSPECIFIED TYPE: ICD-10-CM

## 2020-06-03 PROCEDURE — 99214 OFFICE O/P EST MOD 30 MIN: CPT | Performed by: NURSE PRACTITIONER

## 2020-06-03 RX ORDER — PREDNISOLONE ACETATE 10 MG/ML
SUSPENSION/ DROPS OPHTHALMIC
COMMUNITY
Start: 2020-04-23 | End: 2023-04-05

## 2020-06-03 RX ORDER — TIMOLOL MALEATE 5 MG/ML
SOLUTION/ DROPS OPHTHALMIC
COMMUNITY
Start: 2020-03-23 | End: 2021-07-14 | Stop reason: SDUPTHER

## 2020-06-03 RX ORDER — PRAMIPEXOLE DIHYDROCHLORIDE 0.5 MG/1
TABLET ORAL
COMMUNITY
Start: 2020-05-20 | End: 2023-04-05

## 2020-06-03 NOTE — PATIENT INSTRUCTIONS
I will contact your primary care provider to arrange for stool specimens to be completed in her office which is close to your home.  I will contact you once I have discussed this with your primary care provider.  I am looking for infectious cause for change in bowel habits and worsening diarrhea.    Acid reflux, improved and stable.  Avoid foods that worsen symptoms such as fried foods, dairy and nuts.    Please contact the office with questions or concerns otherwise we will make additional recommendations once stool studies have resulted.

## 2020-06-03 NOTE — PROGRESS NOTES
Diarrhea (increased gas)      HPI  69-year-old female presents today for follow-up.  Last office visit October 10, 2019.  Initial consult October 2017 with Dr. Estrada.  EGD and colonoscopy January 2018. Patient is status post laparoscopic paraesophageal hiatal hernia repair with mesh January 7, 2020 with Dr. Velazquez.    Patient presents today with complaints of diarrhea.  She has had diarrhea on and off over the years however she has had worsening symptoms for the past 4 months.  She would typically have diarrhea that would come and go however for the past 4 months symptoms have not improved and are present every day.  She typically has 1 formed bowel movement first thing in the morning that is solid.  She can then have up to 6 loose urgent bowel movements later in the day.  No relation to oral intake.  No specific food triggers.  She denies melena or hematochezia.  She has had one episode of nocturnal stooling since symptoms started.  She will use Imodium 2 tablets if she is going to an appointment or leaving her house.  She will then have diarrhea the next day.    She has associated cramping located in her lower abdomen when she is having increased frequency.  As soon as she has a bowel movement cramping resolves.    She also has associated gas that has worsened with increased frequency of diarrhea.  This occurs on a regular basis.  It occurs when she stands up, she will have flatus.  She is unaware of the need to pass gas.  She has had previous episiotomy from vaginal childbirth.  She was not having difficulty with gas prior to increased frequency of bowel movements.  She has tried over-the-counter Gas-X with no improvement in symptoms.    Patient was taking Metamucil on a regular basis prior to change in symptoms February 2020.  This was helping her have more bulky stools and better control of diarrhea however it was no longer working and she discontinued dietary fiber.    She denies sick contacts.  She is not  sure if she had antibiotics during her hospital stay for paraesophageal hernia repair.  She denies travel.    Acid reflux, nausea and pressure in her chest has resolved since hernia repair.  She has indigestion occasionally with certain foods such as dairy, nuts and fried foods.  This is stable and self resolves.    Review of previous records.  EGD and colonoscopy January 2018 with a large hiatal hernia otherwise normal exam.  Biopsies were obtained with increased lymphocytes and follow-up celiac blood work was negative.  Colonoscopy with good bowel prep, multiple small mouth diverticula found in the sigmoid colon, biopsies unremarkable for lymphocytic or microscopic colitis.      Review of Systems   Constitutional: Negative.    HENT: Negative.    Eyes: Negative.    Respiratory: Negative.    Cardiovascular: Negative.    Gastrointestinal: Positive for diarrhea.        Gas   Endocrine: Negative.    Genitourinary: Negative.    Musculoskeletal: Negative.    Skin: Negative.    Allergic/Immunologic: Negative.    Neurological: Negative.    Hematological: Negative.    Psychiatric/Behavioral: Negative.         Problem List:    Patient Active Problem List   Diagnosis   • Dyspnea on exertion   • Fatigue   • Syncope and collapse   • Hypothyroidism   • Hyperlipidemia   • Adrenal insufficiency (Sch[place]umb[place]'s disease) (CMS/HCC)   • Change in bowel habits   • Diarrhea   • Status post repair of paraesophageal diaphragmatic hernia       Medical History:    Past Medical History:   Diagnosis Date   • Adrenal insufficiency (CMS/HCC)    • Arthritis    • Depression    • Fatigue    • GERD (gastroesophageal reflux disease)    • Hyperlipidemia    • IBS (irritable bowel syndrome)    • Paraesophageal hernia    • Restless leg    • Seasonal allergies         Social History:    Social History     Socioeconomic History   • Marital status:      Spouse name: Not on file   • Number of children: Not on file   • Years of education: Not on file   •  Highest education level: Not on file   Tobacco Use   • Smoking status: Former Smoker     Packs/day: 1.00     Years: 30.00     Pack years: 30.00     Types: Cigarettes     Last attempt to quit: 2000     Years since quittin.4   • Smokeless tobacco: Never Used   Substance and Sexual Activity   • Alcohol use: Yes     Comment: social, 1-2 drinks occassionally   • Drug use: No       Family History:   Family History   Problem Relation Age of Onset   • Heart disease Mother    • Aortic aneurysm Sister    • Heart disease Sister    • Breast cancer Sister    • Colon polyps Sister    • Aortic aneurysm Brother    • Heart disease Brother    • Stroke Brother         CVA   • Hypertension Other    • Malig Hyperthermia Neg Hx    • Colon cancer Neg Hx        Surgical History:   Past Surgical History:   Procedure Laterality Date   • BREAST BIOPSY Bilateral     benign pathology   • COLONOSCOPY N/A 2018    done at [facility]   • PARAESOPHAGEAL HERNIA REPAIR N/A 2020    Procedure: LAPAROSCOPIC PARAESOPHAGEAL HERNIA REPAIR WITH MESH;  Surgeon: Sunil Velazquez MD;  Location: [facility];  Service: General   • PITUITARY EXCISION      partial   • SUBTOTAL HYSTERECTOMY Bilateral     Bilateral ovaries in tact-Dr. Ulrich         Current Outpatient Medications:   •  cetirizine (zyrTEC) 10 MG tablet, Take 10 mg by mouth Daily., Disp: , Rfl:   •  Difluprednate 0.05 % emulsion, Administer 1 drop to the right eye Daily., Disp: , Rfl:   •  escitalopram (LEXAPRO) 20 MG tablet, Take 20 mg by mouth Daily., Disp: , Rfl:   •  levothyroxine (SYNTHROID, LEVOTHROID) 75 MCG tablet, Take 75 mcg by mouth Daily., Disp: , Rfl:   •  predniSONE (DELTASONE) 5 MG tablet, Take 7.5 mg by mouth Daily., Disp: , Rfl:   •  timolol (TIMOPTIC) 0.25 % ophthalmic solution, Administer 1 drop into the left eye Daily., Disp: , Rfl:   •  pramipexole (MIRAPEX) 0.125 MG tablet, Take 0.5 mg by mouth Every Evening., Disp: , Rfl:   •  prednisoLONE  acetate (PRED FORTE) 1 % ophthalmic suspension, , Disp: , Rfl:   •  Probiotic Product (PROBIOTIC PO), Take 1 capsule by mouth Daily. Culturelle OTC, Disp: , Rfl:   •  psyllium (METAMUCIL) 58.6 % packet, Take 1 packet by mouth Daily., Disp: , Rfl:     Allergies:   Allergies   Allergen Reactions   • Sulfa Antibiotics Rash        The following portions of the patient's history were reviewed and updated as appropriate: allergies, current medications, past family history, past medical history, past social history, past surgical history and problem list.    Vitals:    06/03/20 1212   BP: 130/80   Pulse: 80   Resp: 16   Temp: 97.3 °F (36.3 °C)   SpO2: 98%         06/03/20  1212   Weight: 70.8 kg (156 lb)     Body mass index is 27.63 kg/m².      Physical Exam   Constitutional: She is oriented to person, place, and time. She appears well-developed and well-nourished.   Pulmonary/Chest: Effort normal and breath sounds normal.   Abdominal: Soft. Bowel sounds are normal. She exhibits no distension. There is no tenderness. There is no rebound and no guarding.   Neurological: She is oriented to person, place, and time.   Skin: Skin is dry.   Psychiatric: She has a normal mood and affect. Her behavior is normal. Judgment and thought content normal.   Vitals reviewed.       Assessment/ Plan  Mariana was seen today for diarrhea.    Diagnoses and all orders for this visit:    Change in bowel habits    Diarrhea, unspecified type    Status post repair of paraesophageal diaphragmatic hernia         No follow-ups on file.    Patient Instructions   I will contact your primary care provider to arrange for stool specimens to be completed in her office which is close to your home.  I will contact you once I have discussed this with your primary care provider.  I am looking for infectious cause for change in bowel habits and worsening diarrhea.    Acid reflux, improved and stable.  Avoid foods that worsen symptoms such as fried foods, dairy  and nuts.    Please contact the office with questions or concerns otherwise we will make additional recommendations once stool studies have resulted.    For colon cancer screening, patient is up-to-date.  She has had biopsies for microscopic/lymphocytic colitis in 2018 that were unremarkable.  For screening purposes patient is recommended for colonoscopy at 10-year interval, January 2028 however this can be performed sooner based on clinical course.    Discussion:    If stool studies are negative for infection, to consider course of Xifaxan.  Also to consider gluten free diet given abnormal small bowel biopsies but negative celiac labs.    I will contact the patient's primary care provider at [phone] to arrange for stool studies to be obtained at her office which is closer to the patient and we will request results faxed to our office for review and further recommendations in order to save the patient unnecessary travel.    To consider by sequestering agent however patient is not status post cholecystectomy.  Also offered dicyclomine however patient deferred as she states that cramping is limited occurring right before bowel movement and resolves after she has a bowel movement.

## 2020-06-04 ENCOUNTER — TELEPHONE (OUTPATIENT)
Dept: GASTROENTEROLOGY | Facility: CLINIC | Age: 70
End: 2020-06-04

## 2020-06-04 NOTE — TELEPHONE ENCOUNTER
I saw her in the office Damaris 3, 2020.      She needs stool studies for change in bowel habits to make sure there is no infection.      She was unable to submit stool specimen at the time of her visit yesterday.      I have contacted her primary care provider, Racheal LANDEROS at [phone].      I have left 2 messages as patient would like to have these stool orders and specimens completed with her primary care provider as she lives over 2 hours away from our office and her primary care provider is less than 10 minutes from her home address.    The orders for the stool studies are in her chart, they can be faxed to her primary care provider or her primary care provider can order the tests and then send the results to us if the patient can have the test performed in her office.  Patient would need to  collection kit from primary care provider office or what ever their procedure is.    If it is not possible to have the stool studies performed with primary care provider office in the lab inside primary care provider office, patient would need to go to the nearest hospital which she told me is approximately 30 minutes away to turn in stool specimens.    Please reach out to primary care provider office again later today if you have not heard from them and let patient know that we are still waiting to hear from the office regarding ordering stool studies and the logistics.    Emily

## 2020-06-05 NOTE — TELEPHONE ENCOUNTER
Notified patient I spoke with Makenzie at her pcp office.  Ok to do the stool studies at their facility.  Faxed order to [phone] and notified patient. pk

## 2021-07-14 ENCOUNTER — OFFICE VISIT (OUTPATIENT)
Dept: GASTROENTEROLOGY | Facility: CLINIC | Age: 71
End: 2021-07-14

## 2021-07-14 VITALS
DIASTOLIC BLOOD PRESSURE: 90 MMHG | BODY MASS INDEX: 29.23 KG/M2 | TEMPERATURE: 97.3 F | HEIGHT: 63 IN | SYSTOLIC BLOOD PRESSURE: 144 MMHG | WEIGHT: 165 LBS

## 2021-07-14 DIAGNOSIS — K76.0 FATTY LIVER: ICD-10-CM

## 2021-07-14 DIAGNOSIS — R74.8 ELEVATED LIVER ENZYMES: Primary | ICD-10-CM

## 2021-07-14 DIAGNOSIS — R94.5 ABNORMAL RESULTS OF LIVER FUNCTION STUDIES: ICD-10-CM

## 2021-07-14 PROCEDURE — 99214 OFFICE O/P EST MOD 30 MIN: CPT | Performed by: NURSE PRACTITIONER

## 2021-07-14 NOTE — PATIENT INSTRUCTIONS
Recommend additional blood work to check for other causes for elevated liver enzymes.  Proceed with blood work as discussed, orders placed.    We have requested liver function tests from primary care provider for review and will update chart accordingly.     Continue omeprazole for acid reflux.     Further recommendations will be made pending the results of the above work up and clinical course.

## 2021-07-14 NOTE — PROGRESS NOTES
"Chief Complaint   Patient presents with   • Abnormal Lab     elevated liver function tests, fatty liver on ultrasound         History of Present Illness  70-year-old female presents today for follow-up. Last visit 6/3/2020. Initial consult October 2017 with Dr. Estrada. EGD and colonoscopy January 2018. She is status post laparoscopic paraesophageal hernia repair with mesh January 2020 with Dr. Velazquez.    She presents today for elevated liver function test. Blood work was obtained for routine monitoring during wellness check with her primary care provider. This was the first time she was told that her liver function tests were elevated. They were repeated 1 week later and patient reports they remained elevated. We do not have the blood work results from her primary care provider to review at today's visit.    Given elevated liver function test she had right upper quadrant ultrasound. This revealed fatty liver and gallbladder sludge.    No jaundice. Social alcohol use 1-2 per month.     Epigastric pain comes and goes. This has been ongoing for many years at times. No relation to fatty meal or dietary intake. This can be mild to moderate in severity but is stable. No nausea or vomiting.    Bowel movements are yellow, patient reports this is a change in color over the past couple of months. Bowel movements are now regular, no longer needed metamucil. Change after stopping OTC pain rx for arthritis.     Omeprazole for acid reflux, stable.     Vital Signs:   /90   Temp 97.3 °F (36.3 °C)   Ht 160 cm (63\")   Wt 74.8 kg (165 lb)   BMI 29.23 kg/m²     Body mass index is 29.23 kg/m².     Physical Exam  Vitals reviewed.   Constitutional:       Appearance: Normal appearance. She is not ill-appearing.   Pulmonary:      Effort: Pulmonary effort is normal. No respiratory distress.   Abdominal:      General: Bowel sounds are normal. There is no distension.      Palpations: Abdomen is soft. Abdomen is not rigid. There is " no mass or pulsatile mass.      Tenderness: There is no abdominal tenderness. There is no guarding or rebound.   Neurological:      Mental Status: She is alert.       Assessment and Plan    Diagnoses and all orders for this visit:    1. Elevated liver enzymes (Primary)  -     Alpha - 1 - Antitrypsin  -     KAN  -     Anti-microsomal Antibody  -     Anti-Smooth Muscle Antibody Titer  -     CBC & Differential  -     Comprehensive Metabolic Panel  -     Ferritin  -     Celiac Disease Panel  -     Ceruloplasmin  -     Gamma GT  -     Hepatitis Panel, Acute  -     IgG, IgA, IgM  -     Iron Profile  -     Mitochondrial Antibodies, M2    2. Fatty liver  -     Alpha - 1 - Antitrypsin  -     KAN  -     Anti-microsomal Antibody  -     Anti-Smooth Muscle Antibody Titer  -     CBC & Differential  -     Comprehensive Metabolic Panel  -     Ferritin  -     Celiac Disease Panel  -     Ceruloplasmin  -     Gamma GT  -     Hepatitis Panel, Acute  -     IgG, IgA, IgM  -     Iron Profile  -     Mitochondrial Antibodies, M2    3. Abnormal results of liver function studies   -     Hepatitis Panel, Acute    Fatty liver and sludge on recent ultrasound and elevated liver function tests. Will proceed with liver serologies for additional work up given fatty liver and recent elevated liver function tests.     We have requested previous labs from Primary care provider as well.     To consider liver biopsy based on results of the above work up and clinical course. Also to consider noninvasive monitoring with [employer] as well.     Additional recommendations will be made based on results of the above work up and clinical course.     Patient Instructions   Recommend additional blood work to check for other causes for elevated liver enzymes.  Proceed with blood work as discussed, orders placed.    We have requested liver function tests from primary care provider for review and will update chart accordingly.     Continue omeprazole for acid reflux.      Further recommendations will be made pending the results of the above work up and clinical course.         EMR Dragon/Transcription Disclaimer:  This document has been Dictated utilizing Dragon dictation.

## 2021-07-16 LAB
A1AT SERPL-MCNC: 126 MG/DL (ref 101–187)
ACTIN IGG SERPL-ACNC: 3 UNITS (ref 0–19)
ALBUMIN SERPL-MCNC: 4.1 G/DL (ref 3.8–4.8)
ALBUMIN/GLOB SERPL: 1.3 {RATIO} (ref 1.2–2.2)
ALP SERPL-CCNC: 119 IU/L (ref 48–121)
ALT SERPL-CCNC: 65 IU/L (ref 0–32)
ANA SER QL: NEGATIVE
AST SERPL-CCNC: 60 IU/L (ref 0–40)
BASOPHILS # BLD AUTO: 0.1 X10E3/UL (ref 0–0.2)
BASOPHILS NFR BLD AUTO: 1 %
BILIRUB SERPL-MCNC: 0.4 MG/DL (ref 0–1.2)
BUN SERPL-MCNC: 16 MG/DL (ref 8–27)
BUN/CREAT SERPL: 20 (ref 12–28)
CALCIUM SERPL-MCNC: 9.3 MG/DL (ref 8.7–10.3)
CERULOPLASMIN SERPL-MCNC: 23.5 MG/DL (ref 19–39)
CHLORIDE SERPL-SCNC: 100 MMOL/L (ref 96–106)
CO2 SERPL-SCNC: 22 MMOL/L (ref 20–29)
CREAT SERPL-MCNC: 0.8 MG/DL (ref 0.57–1)
ENDOMYSIUM IGA SER QL: NEGATIVE
EOSINOPHIL # BLD AUTO: 0.3 X10E3/UL (ref 0–0.4)
EOSINOPHIL NFR BLD AUTO: 3 %
ERYTHROCYTE [DISTWIDTH] IN BLOOD BY AUTOMATED COUNT: 13.3 % (ref 11.7–15.4)
FERRITIN SERPL-MCNC: 61 NG/ML (ref 15–150)
GGT SERPL-CCNC: 221 IU/L (ref 0–60)
GLOBULIN SER CALC-MCNC: 3.1 G/DL (ref 1.5–4.5)
GLUCOSE SERPL-MCNC: 90 MG/DL (ref 65–99)
HAV IGM SERPL QL IA: NEGATIVE
HBV CORE IGM SERPL QL IA: NEGATIVE
HBV SURFACE AG SERPL QL IA: NEGATIVE
HCT VFR BLD AUTO: 43.2 % (ref 34–46.6)
HCV AB S/CO SERPL IA: <0.1 S/CO RATIO (ref 0–0.9)
HGB BLD-MCNC: 14 G/DL (ref 11.1–15.9)
IGA SERPL-MCNC: 322 MG/DL (ref 87–352)
IGG SERPL-MCNC: 841 MG/DL (ref 586–1602)
IGM SERPL-MCNC: 78 MG/DL (ref 26–217)
IMM GRANULOCYTES # BLD AUTO: 0 X10E3/UL (ref 0–0.1)
IMM GRANULOCYTES NFR BLD AUTO: 0 %
IRON SATN MFR SERPL: 26 % (ref 15–55)
IRON SERPL-MCNC: 104 UG/DL (ref 27–139)
LKM-1 AB SER-ACNC: <1 UNITS (ref 0–20)
LYMPHOCYTES # BLD AUTO: 2.6 X10E3/UL (ref 0.7–3.1)
LYMPHOCYTES NFR BLD AUTO: 33 %
MCH RBC QN AUTO: 29.7 PG (ref 26.6–33)
MCHC RBC AUTO-ENTMCNC: 32.4 G/DL (ref 31.5–35.7)
MCV RBC AUTO: 92 FL (ref 79–97)
MITOCHONDRIA M2 IGG SER-ACNC: <20 UNITS (ref 0–20)
MONOCYTES # BLD AUTO: 0.7 X10E3/UL (ref 0.1–0.9)
MONOCYTES NFR BLD AUTO: 9 %
NEUTROPHILS # BLD AUTO: 4.3 X10E3/UL (ref 1.4–7)
NEUTROPHILS NFR BLD AUTO: 54 %
PLATELET # BLD AUTO: 225 X10E3/UL (ref 150–450)
POTASSIUM SERPL-SCNC: 4.9 MMOL/L (ref 3.5–5.2)
PROT SERPL-MCNC: 7.2 G/DL (ref 6–8.5)
RBC # BLD AUTO: 4.71 X10E6/UL (ref 3.77–5.28)
SODIUM SERPL-SCNC: 138 MMOL/L (ref 134–144)
TIBC SERPL-MCNC: 405 UG/DL (ref 250–450)
TTG IGA SER-ACNC: <2 U/ML (ref 0–3)
UIBC SERPL-MCNC: 301 UG/DL (ref 118–369)
WBC # BLD AUTO: 7.9 X10E3/UL (ref 3.4–10.8)

## 2021-11-30 ENCOUNTER — OFFICE VISIT (OUTPATIENT)
Dept: NEUROSURGERY | Facility: CLINIC | Age: 71
End: 2021-11-30

## 2021-11-30 VITALS — HEART RATE: 73 BPM | WEIGHT: 169 LBS | HEIGHT: 63 IN | BODY MASS INDEX: 29.95 KG/M2

## 2021-11-30 DIAGNOSIS — M47.812 CERVICAL SPONDYLOSIS WITHOUT MYELOPATHY: Primary | ICD-10-CM

## 2021-11-30 DIAGNOSIS — M47.814 THORACIC SPONDYLOSIS WITHOUT MYELOPATHY: ICD-10-CM

## 2021-11-30 PROCEDURE — 99213 OFFICE O/P EST LOW 20 MIN: CPT | Performed by: NURSE PRACTITIONER

## 2021-11-30 RX ORDER — CYCLOBENZAPRINE HCL 5 MG
TABLET ORAL
COMMUNITY
Start: 2021-11-23 | End: 2023-04-05

## 2021-11-30 NOTE — PROGRESS NOTES
"Chief Complaint  Neck Pain    Subjective          Mariana Gilbert who is a 71 y.o. year old female who presents to [facility] for evaluation of her neck and mid back pain.     Pt with concerns stiffness and limited range of motion in the neck. Rates her pain a 2/10, constant. Head turning, straining makes the pain worse. She denies any radiating pain or numbness into the arms or hands. Denies any weakness in the arms or hands. She has not had any physical therapy or seen chiropractic for her neck pain. Has never seen pain management.     Her main concern is pain in the mid back. Pain radiating into the scapula. Pain is worse with prolonged standing, bending, and twisting. Pain is moderate in intensity. Described as dull, aching. Denies numbness or paresthesias. Denies weakness in the legs. Has some pain in the low back as well. She had an XR Thoracic Spine, we have the report but no disc to review, noting multilevel bone spurring and disc space narrowing.           Recent Interventions: None      Review of Systems   Musculoskeletal: Positive for arthralgias, back pain, neck pain and neck stiffness.   All other systems reviewed and are negative.       Objective   Vital Signs:   Pulse 73   Ht 160 cm (63\")   Wt 76.7 kg (169 lb)   BMI 29.94 kg/m²       Physical Exam  Vitals reviewed.   Constitutional:       Appearance: Normal appearance.   Musculoskeletal:      Cervical back: No tenderness. Decreased range of motion.      Thoracic back: Tenderness present.      Lumbar back: Tenderness present. Negative right straight leg raise test and negative left straight leg raise test.   Neurological:      Mental Status: She is alert and oriented to person, place, and time.      Gait: Gait is intact.      Deep Tendon Reflexes: Strength normal.      Reflex Scores:       Tricep reflexes are 2+ on the right side and 2+ on the left side.       Bicep reflexes are 2+ on the right side " and 2+ on the left side.       Brachioradialis reflexes are 2+ on the right side and 2+ on the left side.       Neurologic Exam     Mental Status   Oriented to person, place, and time.   Level of consciousness: alert    Motor Exam   Muscle bulk: normal  Overall muscle tone: normal    Strength   Strength 5/5 throughout.     Sensory Exam   Light touch normal.     Gait, Coordination, and Reflexes     Gait  Gait: normal    Reflexes   Right brachioradialis: 2+  Left brachioradialis: 2+  Right biceps: 2+  Left biceps: 2+  Right triceps: 2+  Left triceps: 2+  Right Meyer: absent  Left Meyer: absent       Result Review :            MRI Cervical Spine at [place] reviewed. Multilevel spondylosis resulting in mild to moderate canal stenosis without high grade canal or foraminal narrowing. NO cord signal change.      Assessment and Plan    Diagnoses and all orders for this visit:    1. Cervical spondylosis without myelopathy (Primary)    2. Thoracic spondylosis without myelopathy    Pt presenting with concerns of mid back pain. We reviewed her MRI Cervical Spine and XR Thoracic Spine, showing multilevel degenerative changes. No surgical recommendations at this time. We discussed the benefits of core strengthening, weight management, routine stretching for chronic back pain. Recommend physical therapy for traction, ROM, trigger point release, and strengthening of the cervical and thoracic spine. She can follow up in our office on an as needed basis.       Follow Up   No follow-ups on file.  Patient was given instructions and counseling regarding her condition or for health maintenance advice.

## 2022-12-14 RX ORDER — TIMOLOL MALEATE 5 MG/ML
SOLUTION/ DROPS OPHTHALMIC
COMMUNITY
End: 2023-04-05

## 2022-12-14 RX ORDER — PRAMIPEXOLE DIHYDROCHLORIDE 0.75 MG/1
1 TABLET ORAL
COMMUNITY
Start: 2022-06-02 | End: 2023-04-05

## 2022-12-14 RX ORDER — CETIRIZINE HYDROCHLORIDE 10 MG/1
CAPSULE, LIQUID FILLED ORAL
COMMUNITY

## 2022-12-14 RX ORDER — PANTOPRAZOLE SODIUM 40 MG/1
TABLET, DELAYED RELEASE ORAL
COMMUNITY
End: 2023-04-05

## 2022-12-14 RX ORDER — NAPROXEN 500 MG/1
TABLET ORAL
COMMUNITY
Start: 2022-06-22 | End: 2023-04-05

## 2022-12-14 RX ORDER — BACLOFEN 5 MG/1
TABLET ORAL
COMMUNITY
Start: 2022-06-22 | End: 2023-04-05

## 2022-12-15 ENCOUNTER — OFFICE VISIT (OUTPATIENT)
Dept: SURGERY | Facility: CLINIC | Age: 72
End: 2022-12-15

## 2022-12-15 VITALS — BODY MASS INDEX: 29.59 KG/M2 | HEIGHT: 63 IN | WEIGHT: 167 LBS | RESPIRATION RATE: 17 BRPM

## 2022-12-15 DIAGNOSIS — N63.41 UNSPECIFIED LUMP IN RIGHT BREAST, SUBAREOLAR: ICD-10-CM

## 2022-12-15 DIAGNOSIS — Z80.3 FAMILY HISTORY OF BREAST CANCER: Primary | ICD-10-CM

## 2022-12-15 DIAGNOSIS — N63.10 MASS OF RIGHT BREAST, UNSPECIFIED QUADRANT: ICD-10-CM

## 2022-12-15 PROCEDURE — 99203 OFFICE O/P NEW LOW 30 MIN: CPT | Performed by: SURGERY

## 2022-12-15 RX ORDER — DICLOFENAC SODIUM 75 MG/1
TABLET, DELAYED RELEASE ORAL
COMMUNITY
Start: 2022-11-21 | End: 2023-04-05

## 2022-12-15 RX ORDER — LEVOTHYROXINE SODIUM 0.07 MG/1
37.5 TABLET ORAL
COMMUNITY

## 2022-12-15 RX ORDER — TIMOLOL MALEATE 5 MG/ML
1 SOLUTION/ DROPS OPHTHALMIC
COMMUNITY
Start: 2022-07-11 | End: 2023-04-05

## 2022-12-15 RX ORDER — ROPINIROLE 1 MG/1
TABLET, FILM COATED ORAL
COMMUNITY
Start: 2022-10-10

## 2022-12-15 RX ORDER — CETIRIZINE HYDROCHLORIDE 10 MG/1
10 TABLET ORAL
COMMUNITY
End: 2023-04-05

## 2022-12-15 RX ORDER — PREDNISOLONE ACETATE 10 MG/ML
1 SUSPENSION/ DROPS OPHTHALMIC
COMMUNITY

## 2022-12-15 RX ORDER — DICLOFENAC SODIUM 75 MG/1
TABLET, DELAYED RELEASE ORAL EVERY 12 HOURS SCHEDULED
COMMUNITY

## 2022-12-15 NOTE — PROGRESS NOTES
Chief Complaint: Breast Pain    Subjective         History of Present Illness  Mariana Gilbret is a 72 y.o. female presents to [facility] to be seen for right breast lump.  Her recent imaging is shown below:    Impression    Normal diagnostic mammogram right breast ultrasound.  However, biopsy of any palpable abnormality should be performed if clinically indicated.   ___________________________________     ASSESSMENT CATEGORY:   BIRADS Category 1:  Negative.  A letter regarding these results will be sent to the patient by the facility within 30 days.     Narrative      STUDY:   ULTRASOUND BREAST - RIGHT     REASON FOR EXAM:   Female, 72 years old.  Palpable mass     TECHNIQUE:   Axial and longitudinal images of the RIGHT breast were performed with a high resolution ultrasound transducer.     # OF IMAGES:  9     COMPARISON:   Diagnostic mammogram earlier today   ___________________________________     FINDINGS:     RIGHT Breast:   Heterogeneous background echotexture.     At 10 o''clock, 1 cm from the nipple, in the area of the palpable abnormality, ultrasound fails demonstrate a discrete solid or cystic mass most consistent with normal breast parenchyma.:       Impression    Stable bilateral diagnostic mammogram.     Ultrasound of the palpable abnormality in the right breast will be   obtained.   ___________________________________     ASSESSMENT CATEGORY:   BIRADS Category 0:  Incomplete.  Need additional imaging evaluation.  A    letter regarding these results will be sent to the patient by the facility   within 30 days.     FOLLOW UP RECOMMENDATION:   Ultrasound Recommended. (I)     Approximately 10% of breast cancers are not detected by mammography.  A   normal mammogram should not delay biopsy of a clinically suspicious   abnormality.       Narrative    MAMMOGRAPHY - BILATERAL DIAGNOSTIC     REASON FOR EXAM:   Female, 72 years old.  Unspecified lump in right   breast,  "subareolar     PERTINENT HISTORY:  Non-contributory.     TECHNIQUE:  Digital examination.  Mediolateral oblique (MLO) and   craniocaudad (CC) views of both breasts were obtained. CAD: CAD was   performed on this study.     COMPARISON:  11/03/2021   ___________________________________     FINDINGS:   Breast Composition:  There are scattered areas of fibroglandular density.     There are no dominant masses or suspicious calcifications.     No other significant abnormalities are identified.    She has family hx of breast cancer in her younger sister and paternal grandmother as well as her maternal grandmother passing from what she believes was ovarian cancer possibly she just knows it was a \" female cancer.\"    Objective     Past Medical History:   Diagnosis Date   • Adrenal insufficiency (HCC)    • Anemia 1965   • Arthritis    • Breast mass 11/22   • Cholelithiasis 07/22   • Colon polyp 2010   • Depression    • Fatigue    • Fibrocystic breast 1980   • GERD (gastroesophageal reflux disease)    • Hyperlipidemia    • IBS (irritable bowel syndrome)    • Paraesophageal hernia    • Restless leg    • Seasonal allergies        Past Surgical History:   Procedure Laterality Date   • BREAST BIOPSY Bilateral 1970s    benign pathology   • BREAST CYST ASPIRATION  1975   • BREAST CYST ASPIRATION  1975   • COLONOSCOPY N/A 01/2018    done at [facility]   • CORNEAL CHELATION     • EYE SURGERY  2007    Glaucoma   • PARAESOPHAGEAL HERNIA REPAIR N/A 01/07/2020    Procedure: LAPAROSCOPIC PARAESOPHAGEAL HERNIA REPAIR WITH MESH;  Surgeon: Sunil Velazquez MD;  Location: [facility];  Service: General   • PITUITARY EXCISION  1990    partial   • SUBTOTAL HYSTERECTOMY Bilateral 1978    Bilateral ovaries in tact-Dr. Ulrich         Current Outpatient Medications:   •  cetirizine (zyrTEC) 10 MG tablet, Take 10 mg by mouth Daily., Disp: , Rfl:   •  diclofenac (VOLTAREN) 75 MG EC tablet, TAKE ONE TABLET BY MOUTH TWO TIMES PER DAY, Disp: , " Rfl:   •  escitalopram (LEXAPRO) 20 MG tablet, Take 20 mg by mouth Daily., Disp: , Rfl:   •  levothyroxine (SYNTHROID, LEVOTHROID) 75 MCG tablet, Take 37.5 mcg by mouth., Disp: , Rfl:   •  prednisoLONE acetate (PRED FORTE) 1 % ophthalmic suspension, , Disp: , Rfl:   •  rOPINIRole (REQUIP) 1 MG tablet, TAKE ONE TABLET BY MOUTH EVERY EVENING (INCREASE IN DOSE) - STOP THE 0.5 MG, Disp: , Rfl:   •  timolol (TIMOPTIC) 0.25 % ophthalmic solution, Administer 1 drop into the left eye Daily., Disp: , Rfl:   •  Baclofen 5 MG tablet, Take 1 tablet every day by oral route in the evening., Disp: , Rfl:   •  cetirizine (zyrTEC) 10 MG tablet, Take 10 mg by mouth., Disp: , Rfl:   •  Cetirizine HCl (ZyrTEC Allergy) 10 MG capsule, Take 1 capsule every day by oral route., Disp: , Rfl:   •  cyclobenzaprine (FLEXERIL) 5 MG tablet, TAKE ONE TABLET BY MOUTH DAILY AT BEDTIME FOR RESTLESS LEGS, Disp: , Rfl:   •  diclofenac (VOLTAREN) 75 MG EC tablet, Every 12 (Twelve) Hours., Disp: , Rfl:   •  Difluprednate 0.05 % emulsion, Administer 1 drop to the right eye Daily., Disp: , Rfl:   •  levothyroxine (SYNTHROID, LEVOTHROID) 75 MCG tablet, Take 75 mcg by mouth Daily., Disp: , Rfl:   •  naproxen (NAPROSYN) 500 MG tablet, Take 1 tablet twice a day by oral route., Disp: , Rfl:   •  pantoprazole (PROTONIX) 40 MG EC tablet, TAKE ONE TABLET BY MOUTH ONCE DAILY (STOP TAKING OMEPRAZOLE OTC), Disp: , Rfl:   •  pramipexole (MIRAPEX) 0.125 MG tablet, Take 0.5 mg by mouth Every Evening., Disp: , Rfl:   •  pramipexole (MIRAPEX) 0.5 MG tablet, , Disp: , Rfl:   •  pramipexole (MIRAPEX) 0.75 MG tablet, Take 1 tablet by mouth every night at bedtime., Disp: , Rfl:   •  prednisoLONE acetate (PRED FORTE) 1 % ophthalmic suspension, 1 drop., Disp: , Rfl:   •  predniSONE (DELTASONE) 5 MG tablet, Take 7.5 mg by mouth Daily., Disp: , Rfl:   •  timolol (TIMOPTIC) 0.5 % ophthalmic solution, 1 drop left eye, Disp: , Rfl:   •  timolol (TIMOPTIC) 0.5 % ophthalmic solution,  "1 drop., Disp: , Rfl:     Allergies   Allergen Reactions   • Statins Other (See Comments) and Seizure   • Sulfa Antibiotics Rash        Family History   Problem Relation Age of Onset   • Heart disease Mother    • Aortic aneurysm Sister    • Heart disease Sister    • Breast cancer Sister    • Colon polyps Sister    • Aortic aneurysm Brother    • Heart disease Brother    • Stroke Brother         CVA   • COPD Brother    • Hypertension Other    • Arthritis Father    • Malig Hyperthermia Neg Hx    • Colon cancer Neg Hx        Social History     Socioeconomic History   • Marital status:    Tobacco Use   • Smoking status: Former     Packs/day: 1.00     Years: 20.00     Pack years: 20.00     Types: Cigarettes     Start date: 10/15/1963     Quit date: 2000     Years since quittin.9   • Smokeless tobacco: Never   Substance and Sexual Activity   • Alcohol use: Not Currently     Comment: social, 1-2 drinks occassionally   • Drug use: No   • Sexual activity: Not Currently     Partners: Male     Birth control/protection: Pill, Hysterectomy       Vital Signs:   Resp 17   Ht 160 cm (63\")   Wt 75.8 kg (167 lb)   BMI 29.58 kg/m²    Review of Systems    Physical Exam  Vitals and nursing note reviewed.   Constitutional:       Appearance: Normal appearance.   HENT:      Head: Normocephalic and atraumatic.   Eyes:      Extraocular Movements: Extraocular movements intact.      Pupils: Pupils are equal, round, and reactive to light.   Cardiovascular:      Pulses: Normal pulses.   Pulmonary:      Effort: Pulmonary effort is normal. No accessory muscle usage or respiratory distress.   Chest:   Breasts:     Right: Normal. No inverted nipple, nipple discharge or skin change.      Left: Normal. No inverted nipple, nipple discharge or skin change.      Comments: Very dense breast tissue in both breasts which makes palpating small lesions difficult, patient had marked area at 1000 on right breast where she felt small less than " one cm lesion near nipple  Abdominal:      General: Abdomen is flat.      Palpations: Abdomen is soft.      Tenderness: There is no abdominal tenderness. There is no guarding.   Musculoskeletal:         General: No swelling, tenderness or deformity.      Cervical back: Neck supple.   Lymphadenopathy:      Upper Body:      Right upper body: No supraclavicular or axillary adenopathy.      Left upper body: No supraclavicular or axillary adenopathy.   Skin:     General: Skin is warm and dry.   Neurological:      General: No focal deficit present.      Mental Status: She is alert and oriented to person, place, and time.   Psychiatric:         Mood and Affect: Mood normal.         Thought Content: Thought content normal.          Result Review :               Assessment and Plan    Diagnoses and all orders for this visit:    1. Family history of breast cancer (Primary)  -     MRI Breast Bilateral Screening With & Without Contrast; Future    2. Mass of right breast, unspecified quadrant  -     MRI Breast Bilateral Screening With & Without Contrast; Future    3. Unspecified lump in right breast, subareolar  -     MRI Breast Bilateral Screening With & Without Contrast; Future    Will call her with MRI results and possibly set up biopsy based on those results    Follow Up   Return in about 4 weeks (around 1/12/2023).  Patient was given instructions and counseling regarding her condition or for health maintenance advice. Please see specific information pulled into the AVS if appropriate.         This document has been electronically signed by Jolie Valencia MD  December 15, 2022 11:12 EST

## 2022-12-22 ENCOUNTER — HOSPITAL ENCOUNTER (OUTPATIENT)
Dept: MRI IMAGING | Facility: HOSPITAL | Age: 72
Discharge: HOME | End: 2022-12-22
Admitting: SURGERY

## 2022-12-22 DIAGNOSIS — N63.10 MASS OF RIGHT BREAST, UNSPECIFIED QUADRANT: ICD-10-CM

## 2022-12-22 DIAGNOSIS — Z80.3 FAMILY HISTORY OF BREAST CANCER: ICD-10-CM

## 2022-12-22 DIAGNOSIS — N63.41 UNSPECIFIED LUMP IN RIGHT BREAST, SUBAREOLAR: ICD-10-CM

## 2022-12-22 LAB
CREAT BLDA-MCNC: 1 MG/DL
EGFRCR SERPLBLD CKD-EPI 2021: 60 ML/MIN/1.73

## 2022-12-22 PROCEDURE — A9577 INJ MULTIHANCE: HCPCS | Performed by: SURGERY

## 2022-12-22 PROCEDURE — C8908 MRI W/O FOL W/CONT, BREAST,: HCPCS

## 2022-12-22 PROCEDURE — C8937 CAD BREAST MRI: HCPCS

## 2022-12-22 PROCEDURE — 0 GADOBENATE DIMEGLUMINE 529 MG/ML SOLUTION: Performed by: SURGERY

## 2022-12-22 PROCEDURE — 82565 ASSAY OF CREATININE: CPT

## 2022-12-22 RX ADMIN — GADOBENATE DIMEGLUMINE 15 ML: 529 INJECTION, SOLUTION INTRAVENOUS at 10:00

## 2023-01-13 RX ORDER — PREDNISOLONE ACETATE 10 MG/ML
1 SUSPENSION/ DROPS OPHTHALMIC
COMMUNITY

## 2023-01-13 RX ORDER — TIMOLOL MALEATE 5 MG/ML
1 SOLUTION/ DROPS OPHTHALMIC
COMMUNITY
Start: 2022-07-11 | End: 2023-04-05

## 2023-01-13 NOTE — PROGRESS NOTES
Chief Complaint: Follow-up    Subjective         History of Present Illness  Mariana Gilbert is a 72 y.o. female presents to [facility] to be seen for right breast lump.  Her recent imaging is shown below:    Impression    Normal diagnostic mammogram right breast ultrasound.  However, biopsy of any palpable abnormality should be performed if clinically indicated.   ___________________________________     ASSESSMENT CATEGORY:   BIRADS Category 1:  Negative.  A letter regarding these results will be sent to the patient by the facility within 30 days.     Narrative      STUDY:   ULTRASOUND BREAST - RIGHT     REASON FOR EXAM:   Female, 72 years old.  Palpable mass     TECHNIQUE:   Axial and longitudinal images of the RIGHT breast were performed with a high resolution ultrasound transducer.     # OF IMAGES:  9     COMPARISON:   Diagnostic mammogram earlier today   ___________________________________     FINDINGS:     RIGHT Breast:   Heterogeneous background echotexture.     At 10 o''clock, 1 cm from the nipple, in the area of the palpable abnormality, ultrasound fails demonstrate a discrete solid or cystic mass most consistent with normal breast parenchyma.:       Impression    Stable bilateral diagnostic mammogram.     Ultrasound of the palpable abnormality in the right breast will be   obtained.   ___________________________________     ASSESSMENT CATEGORY:   BIRADS Category 0:  Incomplete.  Need additional imaging evaluation.  A    letter regarding these results will be sent to the patient by the facility   within 30 days.     FOLLOW UP RECOMMENDATION:   Ultrasound Recommended. (I)     Approximately 10% of breast cancers are not detected by mammography.  A   normal mammogram should not delay biopsy of a clinically suspicious   abnormality.       Narrative    MAMMOGRAPHY - BILATERAL DIAGNOSTIC     REASON FOR EXAM:   Female, 72 years old.  Unspecified lump in right   breast,  "subareolar     PERTINENT HISTORY:  Non-contributory.     TECHNIQUE:  Digital examination.  Mediolateral oblique (MLO) and   craniocaudad (CC) views of both breasts were obtained. CAD: CAD was   performed on this study.     COMPARISON:  11/03/2021   ___________________________________     FINDINGS:   Breast Composition:  There are scattered areas of fibroglandular density.     There are no dominant masses or suspicious calcifications.     No other significant abnormalities are identified.    She has family hx of breast cancer in her younger sister and paternal grandmother as well as her maternal grandmother passing from what she believes was ovarian cancer possibly she just knows it was a \" female cancer.\"    Narrative & Impression   PROCEDURE:  MRI BREAST BILATERAL W WO CONTRAST     COMPARISON: Other, MG, MAMMO DIAGNOSTIC BILATERAL W CAD, 11/22/2022, 13:43.  Other, MG, MAMMO   SCREENING BILATERAL W CAD, 11/03/2021, 11:48.  Other, US, US BREAST RIGHT COMPLETE, 11/22/2022,   13:48.     INDICATIONS:  Breast asymmetric thickening/nodularity. Right breast palpable area of concern x ~   one month. High-risk screening.     CONTRAST:      15ML  Multihance I.V.     TECHNIQUE:    Breast MRI was performed using dynamic intravenous gadolinium infusion, thin sections,   and a dedicated breast coil.  Contrast enhancement analysis was assisted by computer-aided   detection.       AMOUNT OF FIBROGLANDULAR TISSUE:        Moderate fibroglandular tissue        BACKGROUND PARENCHYMAL ENHANCEMENT:       Mild symmetric     FINDINGS:  A satisfactory contrast bolus is evident.       The area of palpable concern in the anterior subareolar right breast was marked, and included.     No suspicious mass or non mass enhancement is evident.     No abnormality of the nipple or chest wall is evident.     No internal mammary or axillary adenopathy is seen.  No marrow signal abnormality is evident.     IMPRESSION:  High risk screening breast MR " demonstrating no findings of malignancy.     BIRADS:  DIAGNOSTIC CATEGORY 1--NEGATIVE.       RECOMMENDATION(S):  ROUTINE MAMMOGRAM AND CLINICAL EVALUATION IN 12 MONTHS.     HIGH RISK SCREENING BREAST MR IN 1 YEAR.       CLINICAL EVALUATION         Objective     Past Medical History:   Diagnosis Date   • Adrenal insufficiency (HCC)    • Anemia 1965   • Arthritis    • Breast mass 11/22   • Cholelithiasis 07/22   • Colon polyp 2010   • Depression    • Fatigue    • Fibrocystic breast 1980   • GERD (gastroesophageal reflux disease)    • Hyperlipidemia    • IBS (irritable bowel syndrome)    • Paraesophageal hernia    • Restless leg    • Seasonal allergies        Past Surgical History:   Procedure Laterality Date   • BREAST BIOPSY Bilateral 1970s    benign pathology   • BREAST CYST ASPIRATION  1975   • BREAST CYST ASPIRATION  1975   • BREAST CYST ASPIRATION  1975   • BREAST CYST ASPIRATION  1975   • BREAST CYST ASPIRATION  1975   • COLONOSCOPY N/A 01/2018    done at [facility]   • CORNEAL CHELATION     • EYE SURGERY  2007    Glaucoma   • PARAESOPHAGEAL HERNIA REPAIR N/A 01/07/2020    Procedure: LAPAROSCOPIC PARAESOPHAGEAL HERNIA REPAIR WITH MESH;  Surgeon: Sunil Velazquez MD;  Location: [facility];  Service: General   • PITUITARY EXCISION  1990    partial   • SUBTOTAL HYSTERECTOMY Bilateral 1978    Bilateral ovaries in tact-Dr. Ulrich         Current Outpatient Medications:   •  Baclofen 5 MG tablet, Take 1 tablet every day by oral route in the evening., Disp: , Rfl:   •  Cetirizine HCl (ZyrTEC Allergy) 10 MG capsule, Take 1 capsule every day by oral route., Disp: , Rfl:   •  cyclobenzaprine (FLEXERIL) 5 MG tablet, TAKE ONE TABLET BY MOUTH DAILY AT BEDTIME FOR RESTLESS LEGS, Disp: , Rfl:   •  diclofenac (VOLTAREN) 75 MG EC tablet, Every 12 (Twelve) Hours., Disp: , Rfl:   •  Difluprednate 0.05 % emulsion, Administer 1 drop to the right eye Daily., Disp: , Rfl:   •  escitalopram (LEXAPRO) 20 MG tablet, Take 20 mg  by mouth Daily., Disp: , Rfl:   •  levothyroxine (SYNTHROID, LEVOTHROID) 75 MCG tablet, Take 37.5 mcg by mouth., Disp: , Rfl:   •  naproxen (NAPROSYN) 500 MG tablet, Take 1 tablet twice a day by oral route., Disp: , Rfl:   •  pantoprazole (PROTONIX) 40 MG EC tablet, TAKE ONE TABLET BY MOUTH ONCE DAILY (STOP TAKING OMEPRAZOLE OTC), Disp: , Rfl:   •  pramipexole (MIRAPEX) 0.125 MG tablet, Take 0.5 mg by mouth Every Evening., Disp: , Rfl:   •  pramipexole (MIRAPEX) 0.5 MG tablet, , Disp: , Rfl:   •  pramipexole (MIRAPEX) 0.75 MG tablet, Take 1 tablet by mouth every night at bedtime., Disp: , Rfl:   •  prednisoLONE acetate (PRED FORTE) 1 % ophthalmic suspension, 1 drop., Disp: , Rfl:   •  predniSONE (DELTASONE) 5 MG tablet, Take 7.5 mg by mouth Daily., Disp: , Rfl:   •  rOPINIRole (REQUIP) 1 MG tablet, TAKE ONE TABLET BY MOUTH EVERY EVENING (INCREASE IN DOSE) - STOP THE 0.5 MG, Disp: , Rfl:   •  timolol (TIMOPTIC) 0.5 % ophthalmic solution, 1 drop., Disp: , Rfl:   •  cetirizine (zyrTEC) 10 MG tablet, Take 10 mg by mouth Daily., Disp: , Rfl:   •  cetirizine (zyrTEC) 10 MG tablet, Take 10 mg by mouth., Disp: , Rfl:   •  diclofenac (VOLTAREN) 75 MG EC tablet, TAKE ONE TABLET BY MOUTH TWO TIMES PER DAY, Disp: , Rfl:   •  levothyroxine (SYNTHROID, LEVOTHROID) 75 MCG tablet, Take 75 mcg by mouth Daily., Disp: , Rfl:   •  prednisoLONE acetate (PRED FORTE) 1 % ophthalmic suspension, , Disp: , Rfl:   •  prednisoLONE acetate (PRED FORTE) 1 % ophthalmic suspension, 1 drop., Disp: , Rfl:   •  timolol (TIMOPTIC) 0.25 % ophthalmic solution, Administer 1 drop into the left eye Daily., Disp: , Rfl:   •  timolol (TIMOPTIC) 0.5 % ophthalmic solution, 1 drop left eye, Disp: , Rfl:   •  timolol (TIMOPTIC) 0.5 % ophthalmic solution, 1 drop., Disp: , Rfl:     Allergies   Allergen Reactions   • Statins Other (See Comments) and Seizure   • Sulfa Antibiotics Rash        Family History   Problem Relation Age of Onset   • Heart disease Mother   "  • Aortic aneurysm Sister    • Heart disease Sister    • Breast cancer Sister    • Colon polyps Sister    • Aortic aneurysm Brother    • Heart disease Brother    • Stroke Brother         CVA   • COPD Brother    • Hypertension Other    • Arthritis Father    • Malig Hyperthermia Neg Hx    • Colon cancer Neg Hx        Social History     Socioeconomic History   • Marital status:    Tobacco Use   • Smoking status: Former     Packs/day: 1.00     Years: 20.00     Pack years: 20.00     Types: Cigarettes     Start date: 10/15/1963     Quit date: 2000     Years since quittin.0   • Smokeless tobacco: Never   Substance and Sexual Activity   • Alcohol use: Not Currently     Comment: social, 1-2 drinks occassionally   • Drug use: No   • Sexual activity: Not Currently     Partners: Male     Birth control/protection: Pill, Hysterectomy       Vital Signs:   Resp 16   Ht 160 cm (63\")   Wt 75.3 kg (166 lb)   BMI 29.41 kg/m²    Review of Systems    Physical Exam  Vitals and nursing note reviewed.   Constitutional:       Appearance: Normal appearance.   HENT:      Head: Normocephalic and atraumatic.   Eyes:      Extraocular Movements: Extraocular movements intact.      Pupils: Pupils are equal, round, and reactive to light.   Cardiovascular:      Pulses: Normal pulses.   Pulmonary:      Effort: Pulmonary effort is normal. No accessory muscle usage or respiratory distress.   Chest:   Breasts:     Right: Normal. No inverted nipple, nipple discharge or skin change.      Left: Normal. No inverted nipple, nipple discharge or skin change.      Comments: Very dense breast tissue in both breasts which makes palpating small lesions difficult, patient had marked area at 1000 on right breast where she felt small less than one cm lesion near nipple  Abdominal:      General: Abdomen is flat.      Palpations: Abdomen is soft.      Tenderness: There is no abdominal tenderness. There is no guarding.   Musculoskeletal:         " General: No swelling, tenderness or deformity.      Cervical back: Neck supple.   Lymphadenopathy:      Upper Body:      Right upper body: No supraclavicular or axillary adenopathy.      Left upper body: No supraclavicular or axillary adenopathy.   Skin:     General: Skin is warm and dry.   Neurological:      General: No focal deficit present.      Mental Status: She is alert and oriented to person, place, and time.   Psychiatric:         Mood and Affect: Mood normal.         Thought Content: Thought content normal.          Result Review :               Assessment and Plan    Diagnoses and all orders for this visit:    1. Mass of right breast, unspecified quadrant (Primary)    2. At high risk for breast cancer  -     MRI Breast Bilateral Screening With & Without Contrast; Future    3. Other abnormal and inconclusive findings on diagnostic imaging of breast  -     MRI Breast Bilateral Screening With & Without Contrast; Future    Will follow up in one year with MRI and if she feels any areas increase in size or change prior to that she knows to call or come in earlier    Follow Up   Return in about 1 year (around 1/16/2024).  Patient was given instructions and counseling regarding her condition or for health maintenance advice. Please see specific information pulled into the AVS if appropriate.         This document has been electronically signed by Jolie Valencia MD  January 16, 2023 10:49 EST

## 2023-01-16 ENCOUNTER — OFFICE VISIT (OUTPATIENT)
Dept: SURGERY | Facility: CLINIC | Age: 73
End: 2023-01-16
Payer: MEDICARE

## 2023-01-16 VITALS — BODY MASS INDEX: 29.41 KG/M2 | HEIGHT: 63 IN | RESPIRATION RATE: 16 BRPM | WEIGHT: 166 LBS

## 2023-01-16 DIAGNOSIS — R92.8 OTHER ABNORMAL AND INCONCLUSIVE FINDINGS ON DIAGNOSTIC IMAGING OF BREAST: ICD-10-CM

## 2023-01-16 DIAGNOSIS — Z91.89 AT HIGH RISK FOR BREAST CANCER: ICD-10-CM

## 2023-01-16 DIAGNOSIS — N63.10 MASS OF RIGHT BREAST, UNSPECIFIED QUADRANT: Primary | ICD-10-CM

## 2023-01-16 PROCEDURE — 99212 OFFICE O/P EST SF 10 MIN: CPT | Performed by: SURGERY

## 2023-04-05 ENCOUNTER — OFFICE VISIT (OUTPATIENT)
Dept: GASTROENTEROLOGY | Facility: CLINIC | Age: 73
End: 2023-04-05
Payer: MEDICARE

## 2023-04-05 VITALS
HEIGHT: 63 IN | SYSTOLIC BLOOD PRESSURE: 130 MMHG | DIASTOLIC BLOOD PRESSURE: 90 MMHG | WEIGHT: 159.9 LBS | HEART RATE: 75 BPM | TEMPERATURE: 97.1 F | BODY MASS INDEX: 28.33 KG/M2 | OXYGEN SATURATION: 95 %

## 2023-04-05 DIAGNOSIS — E78.5 HYPERLIPIDEMIA, UNSPECIFIED HYPERLIPIDEMIA TYPE: ICD-10-CM

## 2023-04-05 DIAGNOSIS — R74.8 ELEVATED LIVER ENZYMES: ICD-10-CM

## 2023-04-05 DIAGNOSIS — K76.0 FATTY LIVER: Primary | ICD-10-CM

## 2023-04-05 PROCEDURE — 99214 OFFICE O/P EST MOD 30 MIN: CPT | Performed by: NURSE PRACTITIONER

## 2023-04-05 PROCEDURE — 1160F RVW MEDS BY RX/DR IN RCRD: CPT | Performed by: NURSE PRACTITIONER

## 2023-04-05 PROCEDURE — 1159F MED LIST DOCD IN RCRD: CPT | Performed by: NURSE PRACTITIONER

## 2023-04-05 NOTE — PROGRESS NOTES
"Chief Complaint   Patient presents with   • Elevated Hepatic Enzymes         History of Present Illness  Patient is a 72-year-old female who presents today for Follow-up.  She has a history of fatty liver.     She had a recent liver ultrasound showing heterogenous hepatic echotexture possibly from fatty infiltration or cirrhosis.  Recent elevated liver enzymes that are stable, platelet level has been normal.  She was recommended to follow-up here due to findings of possible cirrhosis.    She has previously had a serologic work-up to evaluate cause of liver enzyme elevation which was negative.    Patient denies alcohol use.  Reports she has been watching her diet and working on weight loss and lost 10 pounds.    She denies any abdominal pain, nausea, vomiting, bowel complaints.     Result Review :       CBC AND DIFFERENTIAL (03/20/2023 15:41)   COMPREHENSIVE METABOLIC PANEL (03/20/2023 15:41)   CT Abdomen Pelvis Without Contrast (07/26/2022 11:18)   US Liver (03/07/2023 13:37)   Office Visit with Odalys Angel APRN (07/14/2021)       Vital Signs:   /90   Pulse 75   Temp 97.1 °F (36.2 °C)   Ht 160 cm (62.99\")   Wt 72.5 kg (159 lb 14.4 oz)   SpO2 95%   BMI 28.33 kg/m²     Body mass index is 28.33 kg/m².     Physical Exam  Vitals reviewed.   Constitutional:       General: She is not in acute distress.     Appearance: She is well-developed.   HENT:      Head: Normocephalic and atraumatic.   Pulmonary:      Effort: Pulmonary effort is normal. No respiratory distress.   Abdominal:      General: Abdomen is flat. Bowel sounds are normal. There is no distension.      Palpations: Abdomen is soft. There is no hepatomegaly.      Tenderness: There is no abdominal tenderness.   Skin:     General: Skin is dry.      Coloration: Skin is not pale.   Neurological:      Mental Status: She is alert and oriented to person, place, and time.   Psychiatric:         Thought Content: Thought content normal. "           Assessment and Plan    Diagnoses and all orders for this visit:    1. Fatty liver (Primary)  -     DELGADO Fibrosure  -     US Elastography Parenchyma; Future    2. Elevated liver enzymes  -     DELGADO Fibrosure  -     US Elastography Parenchyma; Future    3. Hyperlipidemia, unspecified hyperlipidemia type  -     DELGADO Fibrosure         Discussion  Patient presents today for follow-up.  Discussed with her today suspect ultrasound findings are secondary to fatty liver.  No overt evidence of cirrhosis on ultrasound or lab work.  Did discuss with patient that over time fatty liver can lead to scarring which can progressed onward to cirrhosis for some patients.  Due to concern for cirrhosis, recommended   FibroSure and FibroScan to evaluate for any fibrosis.  Reviewed dietary and lifestyle modifications to help with fatty liver.  We will provide further recommendations dependent upon test results and clinical course.          Follow Up   Return for Follow up to review results after testing complete.    Patient Instructions   Check DELGADO FibroSure and schedule FibroScan for further evaluation of fatty liver.    For fatty liver, weight loss is recommended. Recommend following a low fat and low sugar diet. Recommend management of diabetes and elevated cholesterol with primary care provider if indicated. Regular exercise is recommended. Alcohol avoidance is recommended.    For fatty liver, start taking milk thistle daily, available over the counter.

## 2023-04-05 NOTE — PATIENT INSTRUCTIONS
Check DELGADO FibroSure and schedule FibroScan for further evaluation of fatty liver.    For fatty liver, weight loss is recommended. Recommend following a low fat and low sugar diet. Recommend management of diabetes and elevated cholesterol with primary care provider if indicated. Regular exercise is recommended. Alcohol avoidance is recommended.    For fatty liver, start taking milk thistle daily, available over the counter.

## 2023-06-05 ENCOUNTER — TELEPHONE (OUTPATIENT)
Dept: GASTROENTEROLOGY | Facility: CLINIC | Age: 73
End: 2023-06-05
Payer: MEDICARE

## 2023-06-05 NOTE — TELEPHONE ENCOUNTER
Patient states she's never received the results of her last labs.  Would like a phone call regarding this.      [phone]

## 2023-06-06 NOTE — TELEPHONE ENCOUNTER
I scanned in the results from her [facility]aez Fibrosure blood work done at [facility]. I will call and request her scan from [facility]. Thank you, Niurka Ferrell RN

## 2023-06-06 NOTE — TELEPHONE ENCOUNTER
Received the results from her Baez FibroSure.  Can you check with the lab to see if they are available?  Thanks.

## 2023-06-15 ENCOUNTER — PREP FOR SURGERY (OUTPATIENT)
Dept: GASTROENTEROLOGY | Facility: CLINIC | Age: 73
End: 2023-06-15

## 2023-06-15 ENCOUNTER — OFFICE VISIT (OUTPATIENT)
Dept: GASTROENTEROLOGY | Facility: CLINIC | Age: 73
End: 2023-06-15
Payer: MEDICARE

## 2023-06-15 VITALS
OXYGEN SATURATION: 97 % | SYSTOLIC BLOOD PRESSURE: 130 MMHG | HEIGHT: 63 IN | TEMPERATURE: 96.9 F | DIASTOLIC BLOOD PRESSURE: 80 MMHG | HEART RATE: 80 BPM | WEIGHT: 159 LBS | BODY MASS INDEX: 28.17 KG/M2

## 2023-06-15 DIAGNOSIS — K74.60 LIVER CIRRHOSIS SECONDARY TO NASH: Primary | ICD-10-CM

## 2023-06-15 DIAGNOSIS — K75.81 LIVER CIRRHOSIS SECONDARY TO NASH: Primary | ICD-10-CM

## 2023-06-15 PROCEDURE — 99214 OFFICE O/P EST MOD 30 MIN: CPT | Performed by: NURSE PRACTITIONER

## 2023-06-15 PROCEDURE — 1160F RVW MEDS BY RX/DR IN RCRD: CPT | Performed by: NURSE PRACTITIONER

## 2023-06-15 PROCEDURE — 1159F MED LIST DOCD IN RCRD: CPT | Performed by: NURSE PRACTITIONER

## 2023-06-15 RX ORDER — SODIUM CHLORIDE 0.9 % (FLUSH) 0.9 %
3 SYRINGE (ML) INJECTION EVERY 12 HOURS SCHEDULED
OUTPATIENT
Start: 2023-06-15

## 2023-06-15 RX ORDER — SODIUM CHLORIDE, SODIUM LACTATE, POTASSIUM CHLORIDE, CALCIUM CHLORIDE 600; 310; 30; 20 MG/100ML; MG/100ML; MG/100ML; MG/100ML
30 INJECTION, SOLUTION INTRAVENOUS CONTINUOUS PRN
OUTPATIENT
Start: 2023-06-15

## 2023-06-15 RX ORDER — SODIUM CHLORIDE 0.9 % (FLUSH) 0.9 %
10 SYRINGE (ML) INJECTION AS NEEDED
OUTPATIENT
Start: 2023-06-15

## 2023-06-15 NOTE — PROGRESS NOTES
"Chief Complaint   Patient presents with    Hepatic Disease         History of Present Illness  Patient is a 72-year-old female who presents today for follow-up. She has a history of fatty liver.  She underwent FibroScan which showed F4 fibrosis with portal hypertension.  Also underwent FibroSure with F3 fibrosis identified.  She had a serologic work-up to evaluate elevated liver enzymes in 2021 that was negative.  Her most recent colonoscopy was in 2018 with recommendation to repeat in 10 years.    Patient presents today for follow-up after testing to review results.  She denies any GI complaints.     Result Review :       SCANNED - IMAGING (04/26/2023)    SCANNED - LABS (05/10/2023)    Office Visit with Keyla Garcia APRN (04/05/2023)    US Liver (03/07/2023 13:37)     Vital Signs:   /80   Pulse 80   Temp 96.9 °F (36.1 °C)   Ht 160 cm (62.99\")   Wt 72.1 kg (159 lb)   SpO2 97%   BMI 28.17 kg/m²     Body mass index is 28.17 kg/m².     Physical Exam  Vitals reviewed.   Constitutional:       General: She is not in acute distress.     Appearance: She is well-developed.   HENT:      Head: Normocephalic and atraumatic.   Pulmonary:      Effort: Pulmonary effort is normal. No respiratory distress.   Skin:     General: Skin is dry.      Coloration: Skin is not pale.   Neurological:      Mental Status: She is alert and oriented to person, place, and time.   Psychiatric:         Thought Content: Thought content normal.         Assessment and Plan    Diagnoses and all orders for this visit:    1. Liver cirrhosis secondary to DELGADO (Primary)  -     CBC & Differential  -     Comprehensive Metabolic Panel  -     AFP Tumor Marker  -     Protime-INR  -     Ammonia  -     US Liver; Future         Discussion  Patient presents today for follow-up after Fibrosure and FibroScan.  Based on results, I do feel this is consistent with cirrhosis secondary to DELGADO.  Discussed with patient that this appears well compensated at " this time.  Discussed dietary and lifestyle modifications to help with fatty liver which when addressed can help slow progression of cirrhosis.  Discussed monitoring for cirrhosis with lab work and ultrasound every 6 months to screen for complications and hepatoma.  Discussed recommendation for EGD to evaluate for varices.  If negative, will repeat every 2 to 3 years.  Reviewed health maintenance concerns as it pertains to new diagnosis of cirrhosis including recommendation for hepatitis a and B vaccination and avoidance of raw shellfish.          Follow Up   Return in about 1 year (around 6/15/2024), or if symptoms worsen or fail to improve.    Patient Instructions   Check lab work today for monitoring of cirrhosis.    Schedule EGD to evaluate for esophageal varices.    Schedule ultrasound for monitoring to be completed September 2023.    Due to cirrhosis, recommend receiving hepatitis A and B vaccinations if not previously received.    Due to cirrhosis, do not eat raw shellfish.    For fatty liver, weight loss is recommended. Recommend following a low fat and low sugar diet. Recommend management of diabetes and elevated cholesterol with primary care provider if indicated. Regular exercise is recommended. Alcohol avoidance is recommended.    For fatty liver, start taking milk thistle daily, available over the counter.     Follow-up in 6 to 12 months.  Call for any new or worsening symptoms.

## 2023-06-15 NOTE — PATIENT INSTRUCTIONS
Check lab work today for monitoring of cirrhosis.    Schedule EGD to evaluate for esophageal varices.    Schedule ultrasound for monitoring to be completed September 2023.    Due to cirrhosis, recommend receiving hepatitis A and B vaccinations if not previously received.    Due to cirrhosis, do not eat raw shellfish.    For fatty liver, weight loss is recommended. Recommend following a low fat and low sugar diet. Recommend management of diabetes and elevated cholesterol with primary care provider if indicated. Regular exercise is recommended. Alcohol avoidance is recommended.    For fatty liver, start taking milk thistle daily, available over the counter.     Follow-up in 6 to 12 months.  Call for any new or worsening symptoms.

## 2023-06-16 DIAGNOSIS — K75.81 LIVER CIRRHOSIS SECONDARY TO NASH: Primary | ICD-10-CM

## 2023-06-16 DIAGNOSIS — K74.60 LIVER CIRRHOSIS SECONDARY TO NASH: Primary | ICD-10-CM

## 2023-06-16 LAB
AFP-TM SERPL-MCNC: 3.8 NG/ML (ref 0–9.2)
ALBUMIN SERPL-MCNC: 4.2 G/DL (ref 3.5–5.2)
ALBUMIN/GLOB SERPL: 1.6 G/DL
ALP SERPL-CCNC: 105 U/L (ref 39–117)
ALT SERPL-CCNC: 45 U/L (ref 1–33)
AMMONIA PLAS-MCNC: 22 UMOL/L (ref 11–51)
AST SERPL-CCNC: 46 U/L (ref 1–32)
BASOPHILS # BLD AUTO: 0.07 10*3/MM3 (ref 0–0.2)
BASOPHILS NFR BLD AUTO: 0.9 % (ref 0–1.5)
BILIRUB SERPL-MCNC: 0.6 MG/DL (ref 0–1.2)
BUN SERPL-MCNC: 13 MG/DL (ref 8–23)
BUN/CREAT SERPL: 14.6 (ref 7–25)
CALCIUM SERPL-MCNC: 9.7 MG/DL (ref 8.6–10.5)
CHLORIDE SERPL-SCNC: 103 MMOL/L (ref 98–107)
CO2 SERPL-SCNC: 23 MMOL/L (ref 22–29)
CREAT SERPL-MCNC: 0.89 MG/DL (ref 0.57–1)
EGFRCR SERPLBLD CKD-EPI 2021: 69 ML/MIN/1.73
EOSINOPHIL # BLD AUTO: 0.17 10*3/MM3 (ref 0–0.4)
EOSINOPHIL NFR BLD AUTO: 2.1 % (ref 0.3–6.2)
ERYTHROCYTE [DISTWIDTH] IN BLOOD BY AUTOMATED COUNT: 14.3 % (ref 12.3–15.4)
GLOBULIN SER CALC-MCNC: 2.7 GM/DL
GLUCOSE SERPL-MCNC: 101 MG/DL (ref 65–99)
HCT VFR BLD AUTO: 39.1 % (ref 34–46.6)
HGB BLD-MCNC: 12.9 G/DL (ref 12–15.9)
IMM GRANULOCYTES # BLD AUTO: 0.02 10*3/MM3 (ref 0–0.05)
IMM GRANULOCYTES NFR BLD AUTO: 0.3 % (ref 0–0.5)
INR PPP: 1.04 (ref 0.9–1.1)
LYMPHOCYTES # BLD AUTO: 2.86 10*3/MM3 (ref 0.7–3.1)
LYMPHOCYTES NFR BLD AUTO: 35.8 % (ref 19.6–45.3)
MCH RBC QN AUTO: 28.3 PG (ref 26.6–33)
MCHC RBC AUTO-ENTMCNC: 33 G/DL (ref 31.5–35.7)
MCV RBC AUTO: 85.7 FL (ref 79–97)
MONOCYTES # BLD AUTO: 0.62 10*3/MM3 (ref 0.1–0.9)
MONOCYTES NFR BLD AUTO: 7.8 % (ref 5–12)
NEUTROPHILS # BLD AUTO: 4.25 10*3/MM3 (ref 1.7–7)
NEUTROPHILS NFR BLD AUTO: 53.1 % (ref 42.7–76)
NRBC BLD AUTO-RTO: 0 /100 WBC (ref 0–0.2)
PLATELET # BLD AUTO: 268 10*3/MM3 (ref 140–450)
POTASSIUM SERPL-SCNC: 4.7 MMOL/L (ref 3.5–5.2)
PROT SERPL-MCNC: 6.9 G/DL (ref 6–8.5)
PROTHROMBIN TIME: 13.8 SECONDS (ref 11.7–14.2)
RBC # BLD AUTO: 4.56 10*6/MM3 (ref 3.77–5.28)
SODIUM SERPL-SCNC: 138 MMOL/L (ref 136–145)
WBC # BLD AUTO: 7.99 10*3/MM3 (ref 3.4–10.8)

## 2023-09-13 ENCOUNTER — TELEPHONE (OUTPATIENT)
Dept: GASTROENTEROLOGY | Facility: CLINIC | Age: 73
End: 2023-09-13
Payer: MEDICARE

## 2023-09-13 DIAGNOSIS — K74.60 LIVER CIRRHOSIS SECONDARY TO NASH: Primary | ICD-10-CM

## 2023-09-13 DIAGNOSIS — K75.81 LIVER CIRRHOSIS SECONDARY TO NASH: Primary | ICD-10-CM

## 2023-09-13 NOTE — TELEPHONE ENCOUNTER
Please let patient know her ultrasound is stable, no liver lesions seen.  No worrisome findings. Recommend repeating in 6 months for monitoring of cirrhosis.

## 2023-11-28 ENCOUNTER — ANESTHESIA EVENT (OUTPATIENT)
Dept: SURGERY | Facility: SURGERY CENTER | Age: 73
End: 2023-11-28
Payer: MEDICARE

## 2023-11-28 ENCOUNTER — HOSPITAL ENCOUNTER (OUTPATIENT)
Facility: SURGERY CENTER | Age: 73
Setting detail: HOSPITAL OUTPATIENT SURGERY
Discharge: HOME | End: 2023-11-28
Attending: INTERNAL MEDICINE | Admitting: INTERNAL MEDICINE
Payer: MEDICARE

## 2023-11-28 ENCOUNTER — ANESTHESIA (OUTPATIENT)
Dept: SURGERY | Facility: SURGERY CENTER | Age: 73
End: 2023-11-28
Payer: MEDICARE

## 2023-11-28 VITALS
WEIGHT: 150 LBS | DIASTOLIC BLOOD PRESSURE: 72 MMHG | RESPIRATION RATE: 18 BRPM | OXYGEN SATURATION: 95 % | BODY MASS INDEX: 27.6 KG/M2 | HEART RATE: 66 BPM | SYSTOLIC BLOOD PRESSURE: 125 MMHG | TEMPERATURE: 97.5 F | HEIGHT: 62 IN

## 2023-11-28 DIAGNOSIS — K75.81 LIVER CIRRHOSIS SECONDARY TO NASH: ICD-10-CM

## 2023-11-28 DIAGNOSIS — K74.60 LIVER CIRRHOSIS SECONDARY TO NASH: ICD-10-CM

## 2023-11-28 PROCEDURE — 43235 EGD DIAGNOSTIC BRUSH WASH: CPT | Performed by: INTERNAL MEDICINE

## 2023-11-28 PROCEDURE — 25810000003 LACTATED RINGERS PER 1000 ML: Performed by: NURSE PRACTITIONER

## 2023-11-28 PROCEDURE — 25010000002 PROPOFOL 10 MG/ML EMULSION: Performed by: ANESTHESIOLOGY

## 2023-11-28 PROCEDURE — 25810000003 LACTATED RINGERS PER 1000 ML: Performed by: ANESTHESIOLOGY

## 2023-11-28 RX ORDER — SODIUM CHLORIDE 0.9 % (FLUSH) 0.9 %
3 SYRINGE (ML) INJECTION EVERY 12 HOURS SCHEDULED
Status: DISCONTINUED | OUTPATIENT
Start: 2023-11-28 | End: 2023-11-28 | Stop reason: HOSPADM

## 2023-11-28 RX ORDER — SODIUM CHLORIDE, SODIUM LACTATE, POTASSIUM CHLORIDE, CALCIUM CHLORIDE 600; 310; 30; 20 MG/100ML; MG/100ML; MG/100ML; MG/100ML
30 INJECTION, SOLUTION INTRAVENOUS CONTINUOUS PRN
Status: DISCONTINUED | OUTPATIENT
Start: 2023-11-28 | End: 2023-11-28 | Stop reason: HOSPADM

## 2023-11-28 RX ORDER — PROPOFOL 10 MG/ML
VIAL (ML) INTRAVENOUS CONTINUOUS PRN
Status: DISCONTINUED | OUTPATIENT
Start: 2023-11-28 | End: 2023-11-28 | Stop reason: SURG

## 2023-11-28 RX ORDER — LIDOCAINE HYDROCHLORIDE 20 MG/ML
INJECTION, SOLUTION INFILTRATION; PERINEURAL AS NEEDED
Status: DISCONTINUED | OUTPATIENT
Start: 2023-11-28 | End: 2023-11-28 | Stop reason: SURG

## 2023-11-28 RX ORDER — SODIUM CHLORIDE, SODIUM LACTATE, POTASSIUM CHLORIDE, CALCIUM CHLORIDE 600; 310; 30; 20 MG/100ML; MG/100ML; MG/100ML; MG/100ML
INJECTION, SOLUTION INTRAVENOUS CONTINUOUS PRN
Status: DISCONTINUED | OUTPATIENT
Start: 2023-11-28 | End: 2023-11-28 | Stop reason: SURG

## 2023-11-28 RX ORDER — SODIUM CHLORIDE 0.9 % (FLUSH) 0.9 %
10 SYRINGE (ML) INJECTION AS NEEDED
Status: DISCONTINUED | OUTPATIENT
Start: 2023-11-28 | End: 2023-11-28 | Stop reason: HOSPADM

## 2023-11-28 RX ADMIN — LIDOCAINE HYDROCHLORIDE 60 MG: 20 INJECTION, SOLUTION INFILTRATION; PERINEURAL at 13:30

## 2023-11-28 RX ADMIN — PROPOFOL 180 MCG/KG/MIN: 10 INJECTION, EMULSION INTRAVENOUS at 13:30

## 2023-11-28 RX ADMIN — SODIUM CHLORIDE, SODIUM LACTATE, POTASSIUM CHLORIDE, AND CALCIUM CHLORIDE: .6; .31; .03; .02 INJECTION, SOLUTION INTRAVENOUS at 13:30

## 2023-11-28 RX ADMIN — SODIUM CHLORIDE, POTASSIUM CHLORIDE, SODIUM LACTATE AND CALCIUM CHLORIDE 30 ML/HR: 600; 310; 30; 20 INJECTION, SOLUTION INTRAVENOUS at 12:10

## 2023-11-28 NOTE — ANESTHESIA POSTPROCEDURE EVALUATION
"Patient: Mariana Gilbert    Procedure Summary       Date: 11/28/23 Room / Location: [facility]  / [facility]    Anesthesia Start: 1327 Anesthesia Stop: 1339    Procedure: ESOPHAGOGASTRODUODENOSCOPY Diagnosis:       Liver cirrhosis secondary to DELGADO      (Liver cirrhosis secondary to DELGADO [K75.81, K74.60])    Surgeons: Harvey Estrada MD Provider: Geovanni Jacobsen MD    Anesthesia Type: MAC ASA Status: 3            Anesthesia Type: MAC    Vitals  Vitals Value Taken Time   /61 11/28/23 1335   Temp 36.4 °C (97.5 °F) 11/28/23 1335   Pulse 65 11/28/23 1335   Resp 18 11/28/23 1335   SpO2 97 % 11/28/23 1335           Post Anesthesia Care and Evaluation    Patient location during evaluation: bedside  Patient participation: complete - patient participated  Level of consciousness: awake and alert  Pain management: adequate    Airway patency: patent  Anesthetic complications: No anesthetic complications    Cardiovascular status: acceptable  Respiratory status: acceptable  Hydration status: acceptable    Comments: /61 (BP Location: Left arm, Patient Position: Lying)   Pulse 65   Temp 36.4 °C (97.5 °F) (Temporal)   Resp 18   Ht 157.5 cm (62\")   Wt 68 kg (150 lb)   SpO2 97%   BMI 27.44 kg/m²     "

## 2023-11-28 NOTE — ANESTHESIA PREPROCEDURE EVALUATION
Anesthesia Evaluation     Patient summary reviewed and Nursing notes reviewed   history of anesthetic complications:   NPO Solid Status: > 8 hours  NPO Liquid Status: > 2 hours           Airway   Mallampati: III  TM distance: >3 FB  Neck ROM: full  Possible difficult intubation  Dental - normal exam     Pulmonary - normal exam    breath sounds clear to auscultation  (+) ,shortness of breath (resolved with increase in steroids)  Cardiovascular - normal exam    Rhythm: regular  Rate: normal    (+) hyperlipidemia      Neuro/Psych  (+) syncope, psychiatric history Depression    ROS Comment: Restless legs    GI/Hepatic/Renal/Endo    (+) hiatal hernia, GERD    ROS Comment: Adrenal insufficiency  IBS    Musculoskeletal (-) negative ROS    Abdominal  - normal exam   Substance History - negative use     OB/GYN negative ob/gyn ROS         Other                          Anesthesia Plan    ASA 3     MAC     intravenous induction     Anesthetic plan, risks, benefits, and alternatives have been provided, discussed and informed consent has been obtained with: patient.

## 2023-11-28 NOTE — H&P
No chief complaint on file.      HPI  Cirrhosis          Problem List:    Patient Active Problem List   Diagnosis    Dyspnea on exertion    Fatigue    Syncope and collapse    Hypothyroidism    Hyperlipidemia    Adrenal insufficiency (Frank[place]'s disease)    Change in bowel habits    Diarrhea    Status post repair of paraesophageal diaphragmatic hernia    Family history of breast cancer    Mass of right breast    Liver cirrhosis secondary to DELGADO       Medical History:    Past Medical History:   Diagnosis Date    Adrenal insufficiency     Anemia 1965    Arthritis     Breast mass     Cholelithiasis     Colon polyp     Depression     Fatigue     Fibrocystic breast 1980    GERD (gastroesophageal reflux disease)     Hyperlipidemia     IBS (irritable bowel syndrome)     Liver cirrhosis secondary to DELGADO 2023    Paraesophageal hernia     Restless leg     Seasonal allergies         Social History:    Social History     Socioeconomic History    Marital status:    Tobacco Use    Smoking status: Former     Packs/day: 1.00     Years: 20.00     Additional pack years: 0.00     Total pack years: 20.00     Types: Cigarettes     Start date: 10/15/1963     Quit date: 2000     Years since quittin.9    Smokeless tobacco: Never   Substance and Sexual Activity    Alcohol use: Not Currently     Comment: social, 1-2 drinks occassionally    Drug use: No    Sexual activity: Not Currently     Partners: Male     Birth control/protection: Pill, Hysterectomy       Family History:   Family History   Problem Relation Age of Onset    Heart disease Mother     Arthritis Father     Colon cancer Sister     Aortic aneurysm Sister     Heart disease Sister     Breast cancer Sister     Aortic aneurysm Brother     Heart disease Brother     Stroke Brother         CVA    COPD Brother     Hypertension Other     Malig Hyperthermia Neg Hx     Crohn's disease Neg Hx     Cystic fibrosis Neg Hx     Irritable bowel syndrome Neg Hx      Ulcerative colitis Neg Hx        Surgical History:   Past Surgical History:   Procedure Laterality Date    BREAST BIOPSY Bilateral 1970s    benign pathology    BREAST CYST ASPIRATION  1975    BREAST CYST ASPIRATION  1975    BREAST CYST ASPIRATION  1975    BREAST CYST ASPIRATION  1975    BREAST CYST ASPIRATION  1975    COLONOSCOPY N/A 01/2018    done at [facility]    CORNEAL CHELATION      EYE SURGERY  2007    Glaucoma    PARAESOPHAGEAL HERNIA REPAIR N/A 01/07/2020    Procedure: LAPAROSCOPIC PARAESOPHAGEAL HERNIA REPAIR WITH MESH;  Surgeon: Sunil Velazquez MD;  Location: [facility] OR;  Service: General    PITUITARY EXCISION  1990    partial    SUBTOTAL HYSTERECTOMY Bilateral 1978    Bilateral ovaries in tact-Dr. Ulrich       No current facility-administered medications for this encounter.    Current Outpatient Medications:     Cetirizine HCl (ZyrTEC Allergy) 10 MG capsule, Take 1 capsule every day by oral route., Disp: , Rfl:     diclofenac (VOLTAREN) 75 MG EC tablet, Every 12 (Twelve) Hours., Disp: , Rfl:     escitalopram (LEXAPRO) 20 MG tablet, Take 1 tablet by mouth Daily., Disp: , Rfl:     levothyroxine (SYNTHROID, LEVOTHROID) 75 MCG tablet, Take 37.5 mcg by mouth., Disp: , Rfl:     prednisoLONE acetate (PRED FORTE) 1 % ophthalmic suspension, 1 drop., Disp: , Rfl:     predniSONE (DELTASONE) 5 MG tablet, Take 1.5 tablets by mouth Daily., Disp: , Rfl:     rOPINIRole (REQUIP) 1 MG tablet, TAKE ONE TABLET BY MOUTH EVERY EVENING (INCREASE IN DOSE) - STOP THE 0.5 MG, Disp: , Rfl:     timolol (TIMOPTIC) 0.25 % ophthalmic solution, Administer 1 drop into the left eye Daily., Disp: , Rfl:     Allergies:   Allergies   Allergen Reactions    Statins Other (See Comments) and Seizure    Sulfa Antibiotics Rash        The following portions of the patient's history were reviewed by me and updated as appropriate: review of systems, allergies, current medications, past family history, past medical history, past social  history, past surgical history and problem list.    There were no vitals filed for this visit.    PHYSICAL EXAM:    CONSTITUTIONAL:  today's vital signs reviewed by me  GASTROINTESTINAL: abdomen is soft nontender nondistended with normal active bowel sounds, no masses are appreciated    Assessment/ Plan  Cirrhosis    egd    Risks and benefits as well as alternatives to endoscopic evaluation were explained to the patient and they voiced understanding and wish to proceed.  These risks include but are not limited to the risk of bleeding, perforation, adverse reaction to sedation, and missed lesions.  The patient was given the opportunity to ask questions prior to the endoscopic procedure.

## 2024-01-16 ENCOUNTER — HOSPITAL ENCOUNTER (OUTPATIENT)
Dept: MRI IMAGING | Facility: HOSPITAL | Age: 74
Discharge: HOME | End: 2024-01-16
Admitting: SURGERY
Payer: MEDICARE

## 2024-01-16 DIAGNOSIS — Z91.89 AT HIGH RISK FOR BREAST CANCER: ICD-10-CM

## 2024-01-16 DIAGNOSIS — R92.8 OTHER ABNORMAL AND INCONCLUSIVE FINDINGS ON DIAGNOSTIC IMAGING OF BREAST: ICD-10-CM

## 2024-01-16 LAB
CREAT BLDA-MCNC: 0.9 MG/DL (ref 0.6–1.3)
EGFRCR SERPLBLD CKD-EPI 2021: 67.6 ML/MIN/1.73

## 2024-01-16 PROCEDURE — A9577 INJ MULTIHANCE: HCPCS | Performed by: SURGERY

## 2024-01-16 PROCEDURE — 80053 COMPREHEN METABOLIC PANEL: CPT | Performed by: NURSE PRACTITIONER

## 2024-01-16 PROCEDURE — 82565 ASSAY OF CREATININE: CPT

## 2024-01-16 PROCEDURE — 82140 ASSAY OF AMMONIA: CPT | Performed by: NURSE PRACTITIONER

## 2024-01-16 PROCEDURE — 85025 COMPLETE CBC W/AUTO DIFF WBC: CPT | Performed by: NURSE PRACTITIONER

## 2024-01-16 PROCEDURE — 82105 ALPHA-FETOPROTEIN SERUM: CPT | Performed by: NURSE PRACTITIONER

## 2024-01-16 PROCEDURE — C8908 MRI W/O FOL W/CONT, BREAST,: HCPCS

## 2024-01-16 PROCEDURE — 0 GADOBENATE DIMEGLUMINE 529 MG/ML SOLUTION: Performed by: SURGERY

## 2024-01-16 PROCEDURE — 85610 PROTHROMBIN TIME: CPT | Performed by: NURSE PRACTITIONER

## 2024-01-16 PROCEDURE — C8937 CAD BREAST MRI: HCPCS

## 2024-01-16 RX ADMIN — GADOBENATE DIMEGLUMINE 15 ML: 529 INJECTION, SOLUTION INTRAVENOUS at 11:25

## 2024-01-24 DIAGNOSIS — Z85.3 PERSONAL HISTORY OF MALIGNANT NEOPLASM OF BREAST: ICD-10-CM

## 2024-01-24 DIAGNOSIS — Z80.3 FAMILY HISTORY OF BREAST CANCER: Primary | ICD-10-CM

## 2024-03-12 ENCOUNTER — HOSPITAL ENCOUNTER (OUTPATIENT)
Dept: ULTRASOUND IMAGING | Facility: HOSPITAL | Age: 74
Discharge: HOME | End: 2024-03-12
Admitting: NURSE PRACTITIONER
Payer: MEDICARE

## 2024-03-12 DIAGNOSIS — K75.81 LIVER CIRRHOSIS SECONDARY TO NASH: ICD-10-CM

## 2024-03-12 DIAGNOSIS — K74.60 LIVER CIRRHOSIS SECONDARY TO NASH: ICD-10-CM

## 2024-03-12 PROCEDURE — 76705 ECHO EXAM OF ABDOMEN: CPT

## 2024-03-13 DIAGNOSIS — K74.60 LIVER CIRRHOSIS SECONDARY TO NASH: Primary | ICD-10-CM

## 2024-03-13 DIAGNOSIS — K75.81 LIVER CIRRHOSIS SECONDARY TO NASH: Primary | ICD-10-CM

## 2024-10-16 ENCOUNTER — HOSPITAL ENCOUNTER (OUTPATIENT)
Dept: ULTRASOUND IMAGING | Facility: HOSPITAL | Age: 74
Discharge: HOME | End: 2024-10-16
Admitting: NURSE PRACTITIONER
Payer: MEDICARE

## 2024-10-16 DIAGNOSIS — K74.60 LIVER CIRRHOSIS SECONDARY TO NASH: ICD-10-CM

## 2024-10-16 DIAGNOSIS — K75.81 LIVER CIRRHOSIS SECONDARY TO NASH: ICD-10-CM

## 2024-10-16 PROCEDURE — 76705 ECHO EXAM OF ABDOMEN: CPT

## (undated) DEVICE — Device

## (undated) DEVICE — MSK ENDO PORT O2 POM ELITE CURAPLEX A/

## (undated) DEVICE — ADAPT CLN SCPE ENDO PORPOISE BX/50 DISP

## (undated) DEVICE — HARMONIC ACE +7 LAPAROSCOPIC SHEARS ADVANCED HEMOSTASIS 5MM DIAMETER 36CM SHAFT LENGTH  FOR USE WITH GRAY HAND PIECE ONLY: Brand: HARMONIC ACE

## (undated) DEVICE — ENDOPATH XCEL BLADELESS TROCARS WITH STABILITY SLEEVES: Brand: ENDOPATH XCEL

## (undated) DEVICE — SINGLE-USE BIOPSY FORCEPS: Brand: RADIAL JAW 4

## (undated) DEVICE — APL DUPLOSPRAYER MIS W/SNP LK 30CM

## (undated) DEVICE — VISUALIZATION SYSTEM: Brand: CLEARIFY

## (undated) DEVICE — BITEBLOCK OMNI BLOC

## (undated) DEVICE — ENDOPATH PNEUMONEEDLE INSUFFLATION NEEDLES WITH LUER LOCK CONNECTORS 120MM: Brand: ENDOPATH

## (undated) DEVICE — COVER,MAYO STAND,STERILE: Brand: MEDLINE

## (undated) DEVICE — GOWN ,SIRUS,NONREINFORCED SMALL: Brand: MEDLINE

## (undated) DEVICE — SUT SILK 0 SH 30IN K834H

## (undated) DEVICE — DEV COND GAS LAP INSUFLOW W/LUER CONN

## (undated) DEVICE — 1842 FOAM BLOCK NEEDLE COUNTER: Brand: DEVON

## (undated) DEVICE — LOU LAP CHOLE: Brand: MEDLINE INDUSTRIES, INC.

## (undated) DEVICE — DISPOSABLE GRASPER CARTRIDGE: Brand: DIRECT DRIVE REPOSABLE GRASPERS

## (undated) DEVICE — GLV SURG PREMIERPRO ORTHO LTX PF SZ7.5 BRN

## (undated) DEVICE — CAP CONN RED

## (undated) DEVICE — SUT VIC 0 TN 27IN DYED JTN0G

## (undated) DEVICE — DRN PENRS 1/4X18IN LTX

## (undated) DEVICE — FLEX ADVANTAGE 1500CC: Brand: FLEX ADVANTAGE

## (undated) DEVICE — GLV SURG BIOGEL LTX PF 6

## (undated) DEVICE — SUT SILK 2/0 SH 30IN K833H

## (undated) DEVICE — VIAL FORMLN CAP 10PCT 20ML

## (undated) DEVICE — KT ORCA ORCAPOD DISP STRL

## (undated) DEVICE — SKIN PREP TRAY W/CHG: Brand: MEDLINE INDUSTRIES, INC.

## (undated) DEVICE — LEGGINGS, PAIR, CLEAR, STERILE: Brand: MEDLINE

## (undated) DEVICE — JELLY,LUBE,STERILE,FLIP TOP,TUBE,4-OZ: Brand: MEDLINE

## (undated) DEVICE — GOWN PROC ENDOARMOR GI LVL3 HY/SHLD UNIV

## (undated) DEVICE — SUT VIC 5/0 PS2 18IN J495H

## (undated) DEVICE — LAPAROSCOPIC SMOKE ELIMINATION DEVICE: Brand: PNEUVIEW XE

## (undated) DEVICE — BANDAGE,GAUZE,BULKEE II,4.5"X4.1YD,STRL: Brand: MEDLINE

## (undated) DEVICE — ENDOPATH XCEL UNIVERSAL TROCAR STABLILITY SLEEVES: Brand: ENDOPATH XCEL

## (undated) DEVICE — ENDOCUT SCISSOR TIP, DISPOSABLE: Brand: RENEW